# Patient Record
Sex: FEMALE | Race: WHITE | Employment: FULL TIME | URBAN - NONMETROPOLITAN AREA
[De-identification: names, ages, dates, MRNs, and addresses within clinical notes are randomized per-mention and may not be internally consistent; named-entity substitution may affect disease eponyms.]

---

## 2017-07-07 ENCOUNTER — OFFICE VISIT (OUTPATIENT)
Dept: PRIMARY CARE CLINIC | Age: 22
End: 2017-07-07
Payer: COMMERCIAL

## 2017-07-07 VITALS
HEIGHT: 61 IN | WEIGHT: 108 LBS | BODY MASS INDEX: 20.39 KG/M2 | OXYGEN SATURATION: 99 % | SYSTOLIC BLOOD PRESSURE: 100 MMHG | DIASTOLIC BLOOD PRESSURE: 60 MMHG | HEART RATE: 66 BPM

## 2017-07-07 DIAGNOSIS — S60.221A CONTUSION OF RIGHT HAND, INITIAL ENCOUNTER: Primary | ICD-10-CM

## 2017-07-07 DIAGNOSIS — M79.641 RIGHT HAND PAIN: ICD-10-CM

## 2017-07-07 PROCEDURE — 99213 OFFICE O/P EST LOW 20 MIN: CPT | Performed by: PHYSICIAN ASSISTANT

## 2017-07-07 ASSESSMENT — ENCOUNTER SYMPTOMS
COLOR CHANGE: 0
NAUSEA: 0

## 2017-07-18 ENCOUNTER — OFFICE VISIT (OUTPATIENT)
Dept: OBGYN | Age: 22
End: 2017-07-18
Payer: COMMERCIAL

## 2017-07-18 VITALS
HEIGHT: 61 IN | HEART RATE: 84 BPM | SYSTOLIC BLOOD PRESSURE: 108 MMHG | RESPIRATION RATE: 14 BRPM | DIASTOLIC BLOOD PRESSURE: 78 MMHG | WEIGHT: 107.4 LBS | BODY MASS INDEX: 20.28 KG/M2

## 2017-07-18 DIAGNOSIS — Z30.09 FAMILY PLANNING ADVICE: ICD-10-CM

## 2017-07-18 DIAGNOSIS — Z01.419 WELL FEMALE EXAM WITH ROUTINE GYNECOLOGICAL EXAM: Primary | ICD-10-CM

## 2017-07-18 DIAGNOSIS — Z11.3 SCREENING FOR STD (SEXUALLY TRANSMITTED DISEASE): ICD-10-CM

## 2017-07-18 PROCEDURE — 87070 CULTURE OTHR SPECIMN AEROBIC: CPT | Performed by: NURSE PRACTITIONER

## 2017-07-18 PROCEDURE — 86403 PARTICLE AGGLUT ANTBDY SCRN: CPT | Performed by: NURSE PRACTITIONER

## 2017-07-18 PROCEDURE — 87491 CHLMYD TRACH DNA AMP PROBE: CPT | Performed by: NURSE PRACTITIONER

## 2017-07-18 PROCEDURE — 99395 PREV VISIT EST AGE 18-39: CPT | Performed by: NURSE PRACTITIONER

## 2017-07-18 PROCEDURE — 87591 N.GONORRHOEAE DNA AMP PROB: CPT | Performed by: NURSE PRACTITIONER

## 2017-07-18 RX ORDER — LEVONORGESTREL AND ETHINYL ESTRADIOL 0.1-0.02MG
1 KIT ORAL DAILY
Qty: 84 TABLET | Refills: 0 | Status: SHIPPED | OUTPATIENT
Start: 2017-07-18 | End: 2017-09-27 | Stop reason: SDUPTHER

## 2017-07-20 LAB
C TRACH DNA GENITAL QL NAA+PROBE: NEGATIVE
N. GONORRHOEAE DNA: NEGATIVE

## 2017-07-20 RX ORDER — CEPHALEXIN 500 MG/1
500 CAPSULE ORAL 3 TIMES DAILY
Qty: 21 CAPSULE | Refills: 0 | Status: SHIPPED | OUTPATIENT
Start: 2017-07-20 | End: 2017-07-27

## 2017-07-21 ENCOUNTER — TELEPHONE (OUTPATIENT)
Dept: OBGYN | Age: 22
End: 2017-07-21

## 2017-07-21 LAB
CULTURE: ABNORMAL
Lab: ABNORMAL
SPECIMEN DESCRIPTION: ABNORMAL
SPECIMEN DESCRIPTION: ABNORMAL
STATUS: ABNORMAL

## 2017-09-27 ENCOUNTER — HOSPITAL ENCOUNTER (OUTPATIENT)
Age: 22
Setting detail: SPECIMEN
Discharge: HOME | End: 2017-09-27
Payer: COMMERCIAL

## 2017-09-27 ENCOUNTER — OFFICE VISIT (OUTPATIENT)
Dept: OBGYN | Age: 22
End: 2017-09-27
Payer: COMMERCIAL

## 2017-09-27 VITALS
DIASTOLIC BLOOD PRESSURE: 58 MMHG | SYSTOLIC BLOOD PRESSURE: 90 MMHG | WEIGHT: 110.8 LBS | HEIGHT: 61 IN | BODY MASS INDEX: 20.92 KG/M2 | HEART RATE: 72 BPM

## 2017-09-27 DIAGNOSIS — B37.31 MONILIAL VAGINITIS: Primary | ICD-10-CM

## 2017-09-27 DIAGNOSIS — Z79.899 MEDICATION MANAGEMENT: ICD-10-CM

## 2017-09-27 DIAGNOSIS — N94.9 VAGINAL BURNING: ICD-10-CM

## 2017-09-27 LAB
CLUE CELLS: NEGATIVE
HYPHAL YEAST: ABNORMAL
KOH RESULT: NEGATIVE
TRICHOMONAS: NEGATIVE
WET PREP: NEGATIVE
WHITE BLOOD CELLS: NEGATIVE

## 2017-09-27 PROCEDURE — 87660 TRICHOMONAS VAGIN DIR PROBE: CPT

## 2017-09-27 PROCEDURE — 87480 CANDIDA DNA DIR PROBE: CPT

## 2017-09-27 PROCEDURE — 87210 SMEAR WET MOUNT SALINE/INK: CPT | Performed by: NURSE PRACTITIONER

## 2017-09-27 PROCEDURE — 87070 CULTURE OTHR SPECIMN AEROBIC: CPT

## 2017-09-27 PROCEDURE — 99214 OFFICE O/P EST MOD 30 MIN: CPT | Performed by: NURSE PRACTITIONER

## 2017-09-27 PROCEDURE — 87510 GARDNER VAG DNA DIR PROBE: CPT

## 2017-09-27 RX ORDER — LEVONORGESTREL AND ETHINYL ESTRADIOL 0.1-0.02MG
1 KIT ORAL DAILY
Qty: 28 TABLET | Refills: 13 | Status: SHIPPED | OUTPATIENT
Start: 2017-09-27 | End: 2018-07-17 | Stop reason: SDUPTHER

## 2017-09-28 LAB
DIRECT EXAM: ABNORMAL
Lab: ABNORMAL
SPECIMEN DESCRIPTION: ABNORMAL
SPECIMEN DESCRIPTION: ABNORMAL
STATUS: ABNORMAL

## 2017-10-16 ENCOUNTER — NURSE ONLY (OUTPATIENT)
Dept: LAB | Age: 22
End: 2017-10-16
Payer: COMMERCIAL

## 2017-10-16 DIAGNOSIS — Z23 NEED FOR IMMUNIZATION AGAINST INFLUENZA: Primary | ICD-10-CM

## 2017-10-16 DIAGNOSIS — Z11.1 PPD SCREENING TEST: ICD-10-CM

## 2017-10-16 PROCEDURE — 86580 TB INTRADERMAL TEST: CPT | Performed by: FAMILY MEDICINE

## 2017-10-16 PROCEDURE — 90686 IIV4 VACC NO PRSV 0.5 ML IM: CPT | Performed by: FAMILY MEDICINE

## 2017-10-16 PROCEDURE — 90471 IMMUNIZATION ADMIN: CPT | Performed by: FAMILY MEDICINE

## 2017-10-16 NOTE — PROGRESS NOTES
Have you had an allergic reaction to the flu (influenza) shot? no  Are you allergic to eggs or any component of the flu vaccine? no  Do you have a history of Guillain-Erie Syndrome (GBS), which is paralysis after receiving the flu vaccine? no  Are you feeling well today? yes  Flu vaccine given as ordered. Patient tolerated it well. No questions re: VIS information.

## 2017-10-18 ENCOUNTER — NURSE ONLY (OUTPATIENT)
Dept: LAB | Age: 22
End: 2017-10-18

## 2017-10-18 DIAGNOSIS — Z11.1 ENCOUNTER FOR PPD SKIN TEST READING: Primary | ICD-10-CM

## 2017-10-18 LAB
INDURATION: 0
TB SKIN TEST: NORMAL

## 2018-04-10 ENCOUNTER — TELEPHONE (OUTPATIENT)
Dept: OBGYN | Age: 23
End: 2018-04-10

## 2018-04-10 DIAGNOSIS — N91.2 AMENORRHEA: Primary | ICD-10-CM

## 2018-04-12 ENCOUNTER — HOSPITAL ENCOUNTER (OUTPATIENT)
Dept: LAB | Age: 23
Setting detail: SPECIMEN
Discharge: HOME | End: 2018-04-12
Payer: COMMERCIAL

## 2018-04-12 DIAGNOSIS — N91.2 AMENORRHEA: ICD-10-CM

## 2018-04-12 LAB — HCG QUALITATIVE: NEGATIVE

## 2018-04-12 PROCEDURE — 36415 COLL VENOUS BLD VENIPUNCTURE: CPT

## 2018-04-12 PROCEDURE — 84703 CHORIONIC GONADOTROPIN ASSAY: CPT

## 2018-05-21 ENCOUNTER — OFFICE VISIT (OUTPATIENT)
Dept: PRIMARY CARE CLINIC | Age: 23
End: 2018-05-21
Payer: COMMERCIAL

## 2018-05-21 ENCOUNTER — HOSPITAL ENCOUNTER (OUTPATIENT)
Age: 23
Setting detail: SPECIMEN
Discharge: HOME | End: 2018-05-21
Payer: COMMERCIAL

## 2018-05-21 VITALS
SYSTOLIC BLOOD PRESSURE: 92 MMHG | HEIGHT: 61 IN | HEART RATE: 72 BPM | BODY MASS INDEX: 20.77 KG/M2 | RESPIRATION RATE: 16 BRPM | OXYGEN SATURATION: 99 % | WEIGHT: 110 LBS | DIASTOLIC BLOOD PRESSURE: 68 MMHG | TEMPERATURE: 97.8 F

## 2018-05-21 DIAGNOSIS — N76.2 ACUTE VULVITIS: ICD-10-CM

## 2018-05-21 DIAGNOSIS — N76.2 ACUTE VULVITIS: Primary | ICD-10-CM

## 2018-05-21 DIAGNOSIS — Z13.31 POSITIVE DEPRESSION SCREENING: ICD-10-CM

## 2018-05-21 PROCEDURE — 87491 CHLMYD TRACH DNA AMP PROBE: CPT

## 2018-05-21 PROCEDURE — 87660 TRICHOMONAS VAGIN DIR PROBE: CPT

## 2018-05-21 PROCEDURE — G0444 DEPRESSION SCREEN ANNUAL: HCPCS | Performed by: FAMILY MEDICINE

## 2018-05-21 PROCEDURE — G8431 POS CLIN DEPRES SCRN F/U DOC: HCPCS | Performed by: FAMILY MEDICINE

## 2018-05-21 PROCEDURE — 99213 OFFICE O/P EST LOW 20 MIN: CPT | Performed by: FAMILY MEDICINE

## 2018-05-21 PROCEDURE — 87480 CANDIDA DNA DIR PROBE: CPT

## 2018-05-21 PROCEDURE — 87591 N.GONORRHOEAE DNA AMP PROB: CPT

## 2018-05-21 PROCEDURE — 87510 GARDNER VAG DNA DIR PROBE: CPT

## 2018-05-21 ASSESSMENT — PATIENT HEALTH QUESTIONNAIRE - PHQ9
7. TROUBLE CONCENTRATING ON THINGS, SUCH AS READING THE NEWSPAPER OR WATCHING TELEVISION: 1
SUM OF ALL RESPONSES TO PHQ9 QUESTIONS 1 & 2: 4
5. POOR APPETITE OR OVEREATING: 1
3. TROUBLE FALLING OR STAYING ASLEEP: 2
8. MOVING OR SPEAKING SO SLOWLY THAT OTHER PEOPLE COULD HAVE NOTICED. OR THE OPPOSITE, BEING SO FIGETY OR RESTLESS THAT YOU HAVE BEEN MOVING AROUND A LOT MORE THAN USUAL: 1
10. IF YOU CHECKED OFF ANY PROBLEMS, HOW DIFFICULT HAVE THESE PROBLEMS MADE IT FOR YOU TO DO YOUR WORK, TAKE CARE OF THINGS AT HOME, OR GET ALONG WITH OTHER PEOPLE: 1
1. LITTLE INTEREST OR PLEASURE IN DOING THINGS: 2
SUM OF ALL RESPONSES TO PHQ QUESTIONS 1-9: 12
2. FEELING DOWN, DEPRESSED OR HOPELESS: 2
6. FEELING BAD ABOUT YOURSELF - OR THAT YOU ARE A FAILURE OR HAVE LET YOURSELF OR YOUR FAMILY DOWN: 1
4. FEELING TIRED OR HAVING LITTLE ENERGY: 2
9. THOUGHTS THAT YOU WOULD BE BETTER OFF DEAD, OR OF HURTING YOURSELF: 0

## 2018-05-22 LAB
C TRACH DNA GENITAL QL NAA+PROBE: NEGATIVE
N. GONORRHOEAE DNA: NEGATIVE

## 2018-05-22 RX ORDER — METRONIDAZOLE 500 MG/1
500 TABLET ORAL 2 TIMES DAILY
Qty: 14 TABLET | Refills: 0 | Status: SHIPPED | OUTPATIENT
Start: 2018-05-22 | End: 2018-05-29

## 2018-05-23 ENCOUNTER — HOSPITAL ENCOUNTER (OUTPATIENT)
Age: 23
Setting detail: SPECIMEN
Discharge: HOME | End: 2018-05-23
Payer: COMMERCIAL

## 2018-05-23 DIAGNOSIS — N76.2 ACUTE VULVITIS: Primary | ICD-10-CM

## 2018-05-23 DIAGNOSIS — N76.2 ACUTE VULVITIS: ICD-10-CM

## 2018-05-23 PROCEDURE — 87140 CULTURE TYPE IMMUNOFLUORESC: CPT

## 2018-05-23 PROCEDURE — 87255 GENET VIRUS ISOLATE HSV: CPT

## 2018-05-23 PROCEDURE — 87252 VIRUS INOCULATION TISSUE: CPT

## 2018-05-23 PROCEDURE — 87015 SPECIMEN INFECT AGNT CONCNTJ: CPT

## 2018-05-27 ASSESSMENT — ENCOUNTER SYMPTOMS
RESPIRATORY NEGATIVE: 1
EYES NEGATIVE: 1
GASTROINTESTINAL NEGATIVE: 1

## 2018-05-29 LAB
CULTURE: NORMAL
Lab: NORMAL
SPECIMEN DESCRIPTION: NORMAL
SPECIMEN DESCRIPTION: NORMAL
STATUS: NORMAL

## 2018-05-31 ENCOUNTER — TELEPHONE (OUTPATIENT)
Dept: FAMILY MEDICINE CLINIC | Age: 23
End: 2018-05-31

## 2018-07-17 ENCOUNTER — OFFICE VISIT (OUTPATIENT)
Dept: OBGYN | Age: 23
End: 2018-07-17
Payer: COMMERCIAL

## 2018-07-17 ENCOUNTER — HOSPITAL ENCOUNTER (OUTPATIENT)
Age: 23
Setting detail: SPECIMEN
Discharge: HOME | End: 2018-07-17
Payer: COMMERCIAL

## 2018-07-17 VITALS
SYSTOLIC BLOOD PRESSURE: 90 MMHG | BODY MASS INDEX: 21.49 KG/M2 | HEIGHT: 61 IN | HEART RATE: 68 BPM | WEIGHT: 113.8 LBS | DIASTOLIC BLOOD PRESSURE: 62 MMHG

## 2018-07-17 DIAGNOSIS — N89.8 VAGINAL IRRITATION: ICD-10-CM

## 2018-07-17 DIAGNOSIS — Z12.4 CERVICAL CANCER SCREENING: ICD-10-CM

## 2018-07-17 DIAGNOSIS — B37.31 MONILIAL VAGINITIS: ICD-10-CM

## 2018-07-17 DIAGNOSIS — Z01.419 WELL FEMALE EXAM WITH ROUTINE GYNECOLOGICAL EXAM: Primary | ICD-10-CM

## 2018-07-17 LAB
CLUE CELLS: ABNORMAL
HYPHAL YEAST: PRESENT
KOH RESULT: NEGATIVE
TRICHOMONAS: NEGATIVE
WET PREP: ABNORMAL
WHITE BLOOD CELLS: NEGATIVE

## 2018-07-17 PROCEDURE — 87660 TRICHOMONAS VAGIN DIR PROBE: CPT

## 2018-07-17 PROCEDURE — 87210 SMEAR WET MOUNT SALINE/INK: CPT | Performed by: NURSE PRACTITIONER

## 2018-07-17 PROCEDURE — 87480 CANDIDA DNA DIR PROBE: CPT

## 2018-07-17 PROCEDURE — G0145 SCR C/V CYTO,THINLAYER,RESCR: HCPCS

## 2018-07-17 PROCEDURE — 87070 CULTURE OTHR SPECIMN AEROBIC: CPT

## 2018-07-17 PROCEDURE — 99395 PREV VISIT EST AGE 18-39: CPT | Performed by: NURSE PRACTITIONER

## 2018-07-17 PROCEDURE — 87510 GARDNER VAG DNA DIR PROBE: CPT

## 2018-07-17 RX ORDER — LEVONORGESTREL AND ETHINYL ESTRADIOL 0.1-0.02MG
1 KIT ORAL DAILY
Qty: 84 TABLET | Refills: 5 | Status: SHIPPED | OUTPATIENT
Start: 2018-07-17 | End: 2019-08-28 | Stop reason: SDUPTHER

## 2018-07-17 NOTE — PROGRESS NOTES
of education: N/A     Occupational History    Not on file. Social History Main Topics    Smoking status: Never Smoker    Smokeless tobacco: Never Used    Alcohol use No    Drug use: No    Sexual activity: No     Other Topics Concern    Not on file     Social History Narrative    ** Merged History Encounter **            MEDICATIONS:  Current Outpatient Prescriptions   Medication Sig Dispense Refill    levonorgestrel-ethinyl estradiol (AVIANE;ALESSE;LESSINA) 0.1-20 MG-MCG per tablet Take 1 tablet by mouth daily 84 tablet 5    terconazole (TERAZOL 3) 0.8 % vaginal cream Place 1 applicator vaginally nightly for 3 days Place vaginally nightly. 1 Tube 0    nystatin-triamcinolone (MYCOLOG II) 620297-5.1 UNIT/GM-% cream Apply topically 2 times daily. 1 Tube 1     No current facility-administered medications for this visit. ALLERGIES:  Allergies as of 07/17/2018    (No Known Allergies)           Gynecologic History:  Menarche: 17 yo  Patient's last menstrual period was 07/12/2018. Hormone Exposure: Yes OCP    Family History of Breast, Ovarian , Colon or Uterine Cancer: Yes 2 maternal aunts with breast cancer       ________________________________________________________________________  REVIEW OF SYSTEMS:     A minimum of an eleven point review of systems was completed.     Review Of Systems (11 point):  General ROS:  negative  Hematological and Lymphatic ROS:negative   Breast ROS: negative  Cardiovascular ROS: negative  Respiratory ROS: negative   Gastrointestinal ROS: negative  Genito-Urinary ROS: positive for vaginal irritation  Psychological ROS: negative  Neurological ROS: negative  Musculoskeletal ROS: negative  Dermatological ROS: negative                                                                                                                                                                                   PHYSICAL Exam:     Constitutional:  Blood pressure 90/62, pulse 68, height 5' 1\" (1.549 m), weight 113 lb 12.8 oz (51.6 kg), last menstrual period 07/12/2018, not currently breastfeeding. General Appearance: This  is a well Developed, well Nourished, well groomed female. Her BMI was reviewed. Skin:  There was a Normal Inspection of the skin without rashes or lesions. There were no rashes. (Papular, Maculopapular, Hives, Pustular, Macular)     There were no lesions (Ulcers, Erythema, Abn. Appearing Nevi)      Lymphatic:  No Lymph Nodes were Palpable in the neck , axilla or groin. Neck and EENT:  The neck was supple. There were no masses   The thyroid was not enlarged and had no masses. PERRLA, Nares Patent No Masses    Respiratory: The lungs were auscultated and found to be clear. There were no rales, rhonchi or wheezes. There was a good respiratory effort. Cardiovascular: The heart was in a regular rate and rhythm. . No S3 or S4. There was no murmur appreciated. Extremities: The patients extremities were without calf tenderness, edema, or varicosities. There was full range of motion in all four extremities. Pulses in all four extremities    Abdomen: The abdomen was soft and non-tender. There were good bowel sounds in all quadrants and there was no guarding, rebound or rigidity. On evaluation there was no evidence of hepatosplenomegaly and there was no costal vertebral siria tenderness bilaterally. No hernias were appreciated. Psych:   The patient had a normal Orientation to: Time, Place, Person, and Situation  Mood and affect appropriate    Breast:  (Chest)  normal appearance, no masses or tenderness, Inspection negative, No nipple retraction or dimpling, No nipple discharge or bleeding, No axillary or supraclavicular adenopathy, Normal to palpation without dominant masses, Taught monthly breast self examination, negative findings: normal in size and symmetry, normal contour with no evidence of flattening or dimpling, skin normal, nipples everted without rashes last menstrual period was 07/12/2018. OBGYN Status: Having periods  Past Surgical History:  No date: WISDOM TOOTH EXTRACTION Bilateral  Medications/Contraceptives Affecting Cytology     Combination Contraceptives - Oral Disp Start End    levonorgestrel-ethinyl estradiol (AVIANE;ALESSE;LESSINA) 0.1-20 MG-MCG   per tablet 28 tablet 9/27/2017     Sig: Take 1 tablet by mouth daily    Notes to Pharmacy: Please cancel any old prescriptions for this product    Route: Oral        Smoking status: Never Smoker                                                              Smokeless tobacco: Never Used                           Standing Status:   Future     Standing Expiration Date:   7/17/2019     Order Specific Question:   Collection Type     Answer: Thin Prep     Order Specific Question:   Prior Abnormal Pap Test     Answer:   No     Order Specific Question:   Screening or Diagnostic     Answer:   Screening     Order Specific Question:   HPV Requested?      Answer:   Yes - If Abnormal Reflex HPV     Order Specific Question:   High Risk Patient     Answer:   N/A    POCT Wet Prep       Claude Bower [place]  7/17/2018      (Please note that portions of this note were completed with a voice-recognition program. Efforts were made to edit the dictations but occasionally words are mis-transcribed.)

## 2018-07-18 LAB
DIRECT EXAM: ABNORMAL
Lab: ABNORMAL
SPECIMEN DESCRIPTION: ABNORMAL
STATUS: ABNORMAL

## 2018-07-21 LAB
CULTURE: ABNORMAL
Lab: ABNORMAL
SPECIMEN DESCRIPTION: ABNORMAL
STATUS: ABNORMAL

## 2018-08-02 LAB — CYTOLOGY REPORT: NORMAL

## 2019-01-07 ENCOUNTER — OFFICE VISIT (OUTPATIENT)
Dept: PRIMARY CARE CLINIC | Age: 24
End: 2019-01-07

## 2019-01-07 VITALS
HEART RATE: 88 BPM | TEMPERATURE: 98.2 F | SYSTOLIC BLOOD PRESSURE: 110 MMHG | DIASTOLIC BLOOD PRESSURE: 74 MMHG | HEIGHT: 61 IN | BODY MASS INDEX: 22.69 KG/M2 | OXYGEN SATURATION: 99 % | WEIGHT: 120.2 LBS

## 2019-01-07 DIAGNOSIS — J06.9 VIRAL UPPER RESPIRATORY TRACT INFECTION: Primary | ICD-10-CM

## 2019-01-07 DIAGNOSIS — J02.9 SORE THROAT: ICD-10-CM

## 2019-01-07 LAB — S PYO AG THROAT QL: NORMAL

## 2019-01-07 PROCEDURE — 87880 STREP A ASSAY W/OPTIC: CPT | Performed by: NURSE PRACTITIONER

## 2019-01-07 PROCEDURE — 99213 OFFICE O/P EST LOW 20 MIN: CPT | Performed by: NURSE PRACTITIONER

## 2019-01-07 ASSESSMENT — ENCOUNTER SYMPTOMS
SHORTNESS OF BREATH: 0
WHEEZING: 0
SINUS PRESSURE: 1
SINUS COMPLAINT: 1
RHINORRHEA: 1
SORE THROAT: 1
GASTROINTESTINAL NEGATIVE: 1
COUGH: 1

## 2019-01-07 ASSESSMENT — PATIENT HEALTH QUESTIONNAIRE - PHQ9
SUM OF ALL RESPONSES TO PHQ QUESTIONS 1-9: 0
SUM OF ALL RESPONSES TO PHQ QUESTIONS 1-9: 0
2. FEELING DOWN, DEPRESSED OR HOPELESS: 0
SUM OF ALL RESPONSES TO PHQ9 QUESTIONS 1 & 2: 0
1. LITTLE INTEREST OR PLEASURE IN DOING THINGS: 0

## 2019-08-28 ENCOUNTER — OFFICE VISIT (OUTPATIENT)
Dept: OBGYN | Age: 24
End: 2019-08-28
Payer: COMMERCIAL

## 2019-08-28 ENCOUNTER — HOSPITAL ENCOUNTER (OUTPATIENT)
Dept: LAB | Age: 24
Discharge: HOME | End: 2019-08-28
Payer: COMMERCIAL

## 2019-08-28 VITALS
WEIGHT: 116 LBS | DIASTOLIC BLOOD PRESSURE: 60 MMHG | SYSTOLIC BLOOD PRESSURE: 96 MMHG | HEART RATE: 74 BPM | BODY MASS INDEX: 21.9 KG/M2 | HEIGHT: 61 IN

## 2019-08-28 DIAGNOSIS — N89.8 VAGINAL DISCHARGE: ICD-10-CM

## 2019-08-28 DIAGNOSIS — Z01.419 WELL FEMALE EXAM WITH ROUTINE GYNECOLOGICAL EXAM: ICD-10-CM

## 2019-08-28 DIAGNOSIS — Z13.1 SCREENING FOR DIABETES MELLITUS: ICD-10-CM

## 2019-08-28 DIAGNOSIS — Z11.3 SCREENING FOR STD (SEXUALLY TRANSMITTED DISEASE): ICD-10-CM

## 2019-08-28 DIAGNOSIS — Z12.4 CERVICAL CANCER SCREENING: ICD-10-CM

## 2019-08-28 DIAGNOSIS — Z01.419 WELL FEMALE EXAM WITH ROUTINE GYNECOLOGICAL EXAM: Primary | ICD-10-CM

## 2019-08-28 LAB
ABSOLUTE EOS #: 0.1 K/UL (ref 0–0.4)
ABSOLUTE IMMATURE GRANULOCYTE: NORMAL K/UL (ref 0–0.3)
ABSOLUTE LYMPH #: 1.6 K/UL (ref 1–4.8)
ABSOLUTE MONO #: 0.5 K/UL (ref 0.1–1.2)
ANION GAP SERPL CALCULATED.3IONS-SCNC: 13 MMOL/L (ref 9–17)
BASOPHILS # BLD: 1 % (ref 0–1)
BASOPHILS ABSOLUTE: 0 K/UL (ref 0–0.2)
BUN BLDV-MCNC: 12 MG/DL (ref 6–20)
BUN/CREAT BLD: 22 (ref 9–20)
CALCIUM SERPL-MCNC: 9.5 MG/DL (ref 8.6–10.4)
CHLORIDE BLD-SCNC: 102 MMOL/L (ref 98–107)
CLUE CELLS: NEGATIVE
CO2: 26 MMOL/L (ref 20–31)
CREAT SERPL-MCNC: 0.55 MG/DL (ref 0.5–0.9)
DIFFERENTIAL TYPE: NORMAL
DIRECT EXAM: ABNORMAL
EOSINOPHILS RELATIVE PERCENT: 1 % (ref 1–7)
GFR AFRICAN AMERICAN: >60 ML/MIN
GFR NON-AFRICAN AMERICAN: >60 ML/MIN
GFR SERPL CREATININE-BSD FRML MDRD: ABNORMAL ML/MIN/{1.73_M2}
GFR SERPL CREATININE-BSD FRML MDRD: ABNORMAL ML/MIN/{1.73_M2}
GLUCOSE BLD-MCNC: 86 MG/DL (ref 70–99)
HCT VFR BLD CALC: 44.9 % (ref 36–46)
HEMOGLOBIN: 15.4 G/DL (ref 12–16)
HYPHAL YEAST: NEGATIVE
IMMATURE GRANULOCYTES: NORMAL %
KOH RESULT: NEGATIVE
LYMPHOCYTES # BLD: 21 % (ref 16–46)
Lab: ABNORMAL
MCH RBC QN AUTO: 31.6 PG (ref 26–34)
MCHC RBC AUTO-ENTMCNC: 34.3 G/DL (ref 31–37)
MCV RBC AUTO: 92.2 FL (ref 80–100)
MONOCYTES # BLD: 7 % (ref 4–11)
NRBC AUTOMATED: NORMAL PER 100 WBC
PDW BLD-RTO: 12.4 % (ref 11–14.5)
PLATELET # BLD: 299 K/UL (ref 140–450)
PLATELET ESTIMATE: NORMAL
PMV BLD AUTO: 8.2 FL (ref 6–12)
POTASSIUM SERPL-SCNC: 4 MMOL/L (ref 3.7–5.3)
RBC # BLD: 4.87 M/UL (ref 4–5.2)
RBC # BLD: NORMAL 10*6/UL
SEG NEUTROPHILS: 70 % (ref 43–77)
SEGMENTED NEUTROPHILS ABSOLUTE COUNT: 5.2 K/UL (ref 1.8–7.7)
SODIUM BLD-SCNC: 141 MMOL/L (ref 135–144)
SPECIMEN DESCRIPTION: ABNORMAL
TRICHOMONAS: NEGATIVE
WBC # BLD: 7.4 K/UL (ref 3.5–11)
WBC # BLD: NORMAL 10*3/UL
WET PREP: NORMAL
WHITE BLOOD CELLS: NEGATIVE

## 2019-08-28 PROCEDURE — 80048 BASIC METABOLIC PNL TOTAL CA: CPT

## 2019-08-28 PROCEDURE — 87480 CANDIDA DNA DIR PROBE: CPT

## 2019-08-28 PROCEDURE — 85025 COMPLETE CBC W/AUTO DIFF WBC: CPT

## 2019-08-28 PROCEDURE — 36415 COLL VENOUS BLD VENIPUNCTURE: CPT

## 2019-08-28 PROCEDURE — 99395 PREV VISIT EST AGE 18-39: CPT | Performed by: NURSE PRACTITIONER

## 2019-08-28 PROCEDURE — 87660 TRICHOMONAS VAGIN DIR PROBE: CPT

## 2019-08-28 PROCEDURE — 87510 GARDNER VAG DNA DIR PROBE: CPT

## 2019-08-28 PROCEDURE — 87591 N.GONORRHOEAE DNA AMP PROB: CPT

## 2019-08-28 PROCEDURE — 87210 SMEAR WET MOUNT SALINE/INK: CPT | Performed by: NURSE PRACTITIONER

## 2019-08-28 PROCEDURE — 87070 CULTURE OTHR SPECIMN AEROBIC: CPT

## 2019-08-28 PROCEDURE — 87491 CHLMYD TRACH DNA AMP PROBE: CPT

## 2019-08-28 RX ORDER — LEVONORGESTREL AND ETHINYL ESTRADIOL 0.1-0.02MG
1 KIT ORAL DAILY
Qty: 84 TABLET | Refills: 5 | Status: SHIPPED | OUTPATIENT
Start: 2019-08-28 | End: 2020-11-05

## 2019-08-28 NOTE — PROGRESS NOTES
four extremities    Abdomen: The abdomen was soft and non-tender. There were good bowel sounds in all quadrants and there was no guarding, rebound or rigidity. On evaluation there was no evidence of hepatosplenomegaly and there was no costal vertebral siria tenderness bilaterally. No hernias were appreciated. Psych: The patient had a normal Orientation to: Time, Place, Person, and Situation  Mood and affect appropriate    Breast:  (Chest)  normal appearance, no masses or tenderness, Inspection negative, No nipple retraction or dimpling, No nipple discharge or bleeding, No axillary or supraclavicular adenopathy, Normal to palpation without dominant masses, Taught monthly breast self examination      Pelvic Exam:  External genitalia: normal general appearance  Urinary system: urethral meatus normal  Vaginal: normal mucosa without prolapse or lesions, normal without tenderness, induration or masses, normal rugae and copious creamy discharge  Cervix: normal appearance and  Adnexa: normal bimanual exam and non palpable  Uterus: normal single, nontender  Saline Prep negative for pathology  Miscellaneous vaginal culture, and swabs for gonorrhea/chlamydia as well as vaginitis DNA obtained     Musculosk:  Normal Gait and station was noted. Digits were evaluated without abnormal findings. Range of motion, stability and strength were evaluated and found to be appropriate for the patients age. ASSESSMENT:      27 y.o. Annual   Diagnosis Orders   1. Well female exam with routine gynecological exam  CBC Auto Differential   2. Cervical cancer screening     3. Screening for diabetes mellitus  Basic Metabolic Panel   4. Screening for STD (sexually transmitted disease)  VAGINITIS DNA PROBE    C.trachomatis N.gonorrhoeae DNA   5. Vaginal discharge  POCT Wet Prep    VAGINITIS DNA PROBE    Culture, Genital        Chief Complaint   Patient presents with    Gynecologic Exam     pap         History reviewed.  No pertinent past

## 2019-08-28 NOTE — PATIENT INSTRUCTIONS
feel your breast tissue before moving on to the next spot. ? Check your entire breast, moving up and down as if following a strip from the collarbone to the bra line, and from the armpit to the ribs. Repeat until you have covered the entire breast.  ? Repeat this procedure for your left breast, using the pads of the 3 middle fingers of your right hand. · To examine your breasts while in the shower:  ? Place one arm over your head and lightly soap your breast on that side. ? Using the pads of your fingers, gently move your hand over your breast (in the strip pattern described above), feeling carefully for any lumps or changes. ? Repeat for the other breast.  · Have your doctor inspect anything you notice to see if you need further testing. Where can you learn more? Go to [email or web address] and sign in to your [employer] account. Enter P148 in the [employer] box to learn more about \"Breast Self-Exam: Care Instructions. \"     If you do not have an account, please click on the \"Sign Up Now\" link. Current as of: December 19, 2018  Content Version: 12.1  © 8688-5751 Healthwise, Incorporated. Care instructions adapted under license by [facility]. If you have questions about a medical condition or this instruction, always ask your healthcare professional. [address] any warranty or liability for your use of this information.

## 2019-08-30 LAB
C TRACH DNA GENITAL QL NAA+PROBE: NEGATIVE
N. GONORRHOEAE DNA: NEGATIVE
SPECIMEN DESCRIPTION: NORMAL

## 2019-08-30 RX ORDER — METRONIDAZOLE 500 MG/1
500 TABLET ORAL 2 TIMES DAILY WITH MEALS
Qty: 14 TABLET | Refills: 0 | Status: SHIPPED | OUTPATIENT
Start: 2019-08-30 | End: 2019-09-06

## 2019-08-31 LAB
CULTURE: NORMAL
Lab: NORMAL
SPECIMEN DESCRIPTION: NORMAL

## 2019-09-11 ENCOUNTER — OFFICE VISIT (OUTPATIENT)
Dept: OBGYN | Age: 24
End: 2019-09-11
Payer: COMMERCIAL

## 2019-09-11 ENCOUNTER — HOSPITAL ENCOUNTER (OUTPATIENT)
Age: 24
Setting detail: SPECIMEN
Discharge: HOME | End: 2019-09-11
Payer: COMMERCIAL

## 2019-09-11 VITALS
BODY MASS INDEX: 22.84 KG/M2 | DIASTOLIC BLOOD PRESSURE: 60 MMHG | SYSTOLIC BLOOD PRESSURE: 100 MMHG | HEIGHT: 61 IN | HEART RATE: 92 BPM | WEIGHT: 121 LBS

## 2019-09-11 DIAGNOSIS — N89.8 VAGINAL DISCHARGE: Primary | ICD-10-CM

## 2019-09-11 DIAGNOSIS — Z12.4 CERVICAL CANCER SCREENING: ICD-10-CM

## 2019-09-11 DIAGNOSIS — N89.8 VAGINAL DISCHARGE: ICD-10-CM

## 2019-09-11 LAB
DIRECT EXAM: NORMAL
Lab: NORMAL
SPECIMEN DESCRIPTION: NORMAL

## 2019-09-11 PROCEDURE — 87624 HPV HI-RISK TYP POOLED RSLT: CPT

## 2019-09-11 PROCEDURE — 99213 OFFICE O/P EST LOW 20 MIN: CPT | Performed by: NURSE PRACTITIONER

## 2019-09-11 PROCEDURE — 87660 TRICHOMONAS VAGIN DIR PROBE: CPT

## 2019-09-11 PROCEDURE — 87510 GARDNER VAG DNA DIR PROBE: CPT

## 2019-09-11 PROCEDURE — 87070 CULTURE OTHR SPECIMN AEROBIC: CPT

## 2019-09-11 PROCEDURE — 87480 CANDIDA DNA DIR PROBE: CPT

## 2019-09-11 PROCEDURE — G0145 SCR C/V CYTO,THINLAYER,RESCR: HCPCS

## 2019-09-11 NOTE — PROGRESS NOTES
Subjective:  Hue Gutierrez presents for follow up of treatment for BV. Patient has long history of candidiasis which is usually asymptomatic. Cultures negative for yeast at last visit. Completed course of flagyl for BV. No change in amount of discharge. Wears liner daily. Due for pap. Will be starting OCP with next menses. Patient Active Problem List   Diagnosis    Sprain of ankle     Problem list reviewed as part of this encounter. Medication list reviewed and updated. See nursing note. History of present illness reviewed in detail and expanded by me. Review Of Systems:  General ROS:  negative  Hematological and Lymphatic ROS:negative   Breast ROS: negative  Cardiovascular ROS: negative  Respiratory ROS: negative   Gastrointestinal ROS: negative  Genito-Urinary ROS: positive for vaginal dishcarge  Psychological ROS: negative  Neurological ROS: negative  Musculoskeletal ROS: negative  Dermatological ROS: negative                                                                                                                                          Objective:  Vitals:    09/11/19 1306   BP: 100/60   Pulse: 92     Alert and oriented. Abdomen: Soft, non-tender, no masses. No CVA tenderness  External Genetalia: Normal appearance of vulva,  Bartholin Glands, urethra, and [place]'s ducts  Vagina: Normal appearance of mucosa and rugae. No unusual discharge  Cervix: Normal appearance. No lesions noted. Thin prep pap obtained  Uterus: Normal size, mobile, no CMT, non-tender  Adnexae: Non-palpable, non-tender, no masses    Miscellaneous vaginal culture as well as vaginitis DNA obtained     Assessment:  Encounter Diagnoses   Name Primary?  Vaginal discharge Yes    Cervical cancer screening        Plan:  See orders.   Orders Placed This Encounter   Procedures    VAGINITIS DNA PROBE     Standing Status:   Future     Standing Expiration Date:   10/11/2019    Culture, Genital     Standing Status:

## 2019-09-14 LAB
CULTURE: NORMAL
Lab: NORMAL
SPECIMEN DESCRIPTION: NORMAL

## 2019-09-20 LAB — CYTOLOGY REPORT: NORMAL

## 2019-09-24 LAB
HPV SAMPLE: ABNORMAL
HPV, GENOTYPE 16: NOT DETECTED
HPV, GENOTYPE 18: NOT DETECTED
HPV, HIGH RISK OTHER: DETECTED
HPV, INTERPRETATION: ABNORMAL
SPECIMEN DESCRIPTION: ABNORMAL

## 2019-09-30 ENCOUNTER — HOSPITAL ENCOUNTER (OUTPATIENT)
Dept: LAB | Age: 24
Discharge: HOME | End: 2019-09-30
Payer: COMMERCIAL

## 2019-09-30 ENCOUNTER — OFFICE VISIT (OUTPATIENT)
Dept: OBGYN | Age: 24
End: 2019-09-30
Payer: COMMERCIAL

## 2019-09-30 VITALS
BODY MASS INDEX: 23.03 KG/M2 | SYSTOLIC BLOOD PRESSURE: 100 MMHG | DIASTOLIC BLOOD PRESSURE: 60 MMHG | HEIGHT: 61 IN | HEART RATE: 70 BPM | WEIGHT: 122 LBS

## 2019-09-30 DIAGNOSIS — Z13.21 ENCOUNTER FOR VITAMIN DEFICIENCY SCREENING: ICD-10-CM

## 2019-09-30 DIAGNOSIS — R87.810 ASCUS WITH POSITIVE HIGH RISK HPV CERVICAL: Primary | ICD-10-CM

## 2019-09-30 DIAGNOSIS — R87.610 ASCUS WITH POSITIVE HIGH RISK HPV CERVICAL: Primary | ICD-10-CM

## 2019-09-30 LAB — VITAMIN D 25-HYDROXY: 20.6 NG/ML (ref 30–100)

## 2019-09-30 PROCEDURE — 99213 OFFICE O/P EST LOW 20 MIN: CPT | Performed by: NURSE PRACTITIONER

## 2019-09-30 PROCEDURE — 36415 COLL VENOUS BLD VENIPUNCTURE: CPT

## 2019-09-30 PROCEDURE — 82306 VITAMIN D 25 HYDROXY: CPT

## 2019-09-30 NOTE — PROGRESS NOTES
· You need to reschedule or have changed your mind about having the procedure. Where can you learn more? Go to [email or web address] and sign in to your [employer] account. Enter M886 in the [facility] box to learn more about \"Colposcopy: Before Your Procedure. \"     If you do not have an account, please click on the \"Sign Up Now\" link. Current as of: December 19, 2018  Content Version: 12.1  © 9504-1311 [employer]. Care instructions adapted under license by [facility] customer insight [facility] ([facility]). If you have questions about a medical condition or this instruction, always ask your healthcare professional. [address] any warranty or liability for your use of this information. Patient Education        Colposcopy: What to Expect at [address] may feel some soreness in your vagina for a day or two if you had a biopsy. Some vaginal bleeding or discharge is normal for up to a week after a biopsy. The discharge may be dark-colored if a solution was put on your cervix. You can use a sanitary pad for the bleeding. It may take a week or two for you to get the test results. This care sheet gives you a general idea about how long it will take for you to recover. But each person recovers at a different pace. Follow the steps below to feel better as quickly as possible. How can you care for yourself at home? Activity    · You can return to work and most daily activities right after the test.   Exercise    · Do not exercise for 1 day after the test.   Medicines    · Your doctor will tell you if and when you can restart your medicines. He or she will also give you instructions about taking any new medicines.     · If you take blood thinners, such as warfarin (Coumadin), clopidogrel (Plavix), or aspirin, be sure to talk to your doctor. He or she will tell you if and when to start taking those medicines again.  Make sure that you understand exactly what your doctor this instruction, always ask your healthcare professional. [place] [address] any warranty or liability for your use of this information.              (Please note that portions of this note were completed with a voice-recognition program. Efforts were made to edit the dictations but occasionally words are mis-transcribed.)

## 2019-09-30 NOTE — PATIENT INSTRUCTIONS
Patient Education        Colposcopy: Before Your Procedure  What is a colposcopy? Colposcopy lets a doctor look at your vulva, vagina, and cervix. If the doctor sees a possible problem, he or she can take a small sample of tissue. Then another doctor studies the tissue under a microscope. This is called a biopsy. Most women have this procedure after they have abnormal results from a Pap test.  During the test, your doctor puts a lubricated tool into your vagina. This is called a speculum. It gently spreads apart the sides of your vagina. This allows your doctor to see inside your vagina and the cervix. The doctor also uses a magnifying device to help him or her see better. This device does not go inside your vagina. The doctor may put vinegar or iodine on your cervix. This can help the doctor to see any areas that are not normal. Sometimes the doctor also takes photos or videos. When the speculum goes in, it can feel a little uncomfortable. If the doctor does a biopsy, you may feel a pinch and have some cramping. Follow-up care is a key part of your treatment and safety. Be sure to make and go to all appointments, and call your doctor if you are having problems. It's also a good idea to know your test results and keep a list of the medicines you take. What happens before the procedure? Preparing for the procedure  · Tell your doctor if:  ? You are having your menstrual period. This test usually is not done during your period. This is because blood makes it harder to see your cervix. ? You are or might be pregnant. A blood or urine test may be done to see if you are pregnant. Colposcopy is safe during pregnancy. The chance of miscarriage is very small. But you may have some bleeding from a biopsy. ? You take blood thinners, such as warfarin (Coumadin), clopidogrel (Plavix), or aspirin.   · Do not douche, use tampons, have sexual intercourse, or use vaginal medicines for 24 hours before the the Search Health Information box to learn more about \"Colposcopy: Before Your Procedure. \"     If you do not have an account, please click on the \"Sign Up Now\" link. Current as of: December 19, 2018  Content Version: 12.1  © 9712-0379 Healthwise, [employer]. Care instructions adapted under license by [facility] ([facility]). If you have questions about a medical condition or this instruction, always ask your healthcare professional. [address] any warranty or liability for your use of this information. Patient Education        Colposcopy: What to Expect at [address] may feel some soreness in your vagina for a day or two if you had a biopsy. Some vaginal bleeding or discharge is normal for up to a week after a biopsy. The discharge may be dark-colored if a solution was put on your cervix. You can use a sanitary pad for the bleeding. It may take a week or two for you to get the test results. This care sheet gives you a general idea about how long it will take for you to recover. But each person recovers at a different pace. Follow the steps below to feel better as quickly as possible. How can you care for yourself at home? Activity    · You can return to work and most daily activities right after the test.   Exercise    · Do not exercise for 1 day after the test.   Medicines    · Your doctor will tell you if and when you can restart your medicines. He or she will also give you instructions about taking any new medicines.     · If you take blood thinners, such as warfarin (Coumadin), clopidogrel (Plavix), or aspirin, be sure to talk to your doctor. He or she will tell you if and when to start taking those medicines again. Make sure that you understand exactly what your doctor wants you to do.     · Take an over-the-counter pain medicine, such as acetaminophen (Tylenol), ibuprofen (Advil, Motrin), or naproxen (Aleve). Be safe with medicines.  Read and follow all instructions on

## 2019-10-01 ENCOUNTER — PROCEDURE VISIT (OUTPATIENT)
Dept: OBGYN | Age: 24
End: 2019-10-01
Payer: COMMERCIAL

## 2019-10-01 ENCOUNTER — HOSPITAL ENCOUNTER (OUTPATIENT)
Age: 24
Setting detail: SPECIMEN
Discharge: HOME | End: 2019-10-01
Payer: COMMERCIAL

## 2019-10-01 VITALS
WEIGHT: 121 LBS | DIASTOLIC BLOOD PRESSURE: 70 MMHG | HEIGHT: 61 IN | RESPIRATION RATE: 20 BRPM | BODY MASS INDEX: 22.84 KG/M2 | HEART RATE: 74 BPM | SYSTOLIC BLOOD PRESSURE: 122 MMHG

## 2019-10-01 DIAGNOSIS — R87.610 ASCUS WITH POSITIVE HIGH RISK HPV CERVICAL: Primary | ICD-10-CM

## 2019-10-01 DIAGNOSIS — R87.810 ASCUS WITH POSITIVE HIGH RISK HPV CERVICAL: ICD-10-CM

## 2019-10-01 DIAGNOSIS — R87.810 ASCUS WITH POSITIVE HIGH RISK HPV CERVICAL: Primary | ICD-10-CM

## 2019-10-01 DIAGNOSIS — E55.9 VITAMIN D DEFICIENCY: Primary | ICD-10-CM

## 2019-10-01 DIAGNOSIS — R87.610 ASCUS WITH POSITIVE HIGH RISK HPV CERVICAL: ICD-10-CM

## 2019-10-01 PROCEDURE — 57455 BIOPSY OF CERVIX W/SCOPE: CPT | Performed by: OBSTETRICS & GYNECOLOGY

## 2019-10-01 PROCEDURE — 88305 TISSUE EXAM BY PATHOLOGIST: CPT

## 2019-10-03 ENCOUNTER — TELEPHONE (OUTPATIENT)
Dept: OBGYN | Age: 24
End: 2019-10-03

## 2019-10-03 LAB — SURGICAL PATHOLOGY REPORT: NORMAL

## 2019-10-03 RX ORDER — MELOXICAM 7.5 MG/1
7.5 TABLET ORAL 2 TIMES DAILY
Qty: 10 TABLET | Refills: 1 | Status: SHIPPED | OUTPATIENT
Start: 2019-10-03 | End: 2019-10-25 | Stop reason: ALTCHOICE

## 2019-10-09 ENCOUNTER — OFFICE VISIT (OUTPATIENT)
Dept: OBGYN | Age: 24
End: 2019-10-09
Payer: COMMERCIAL

## 2019-10-09 ENCOUNTER — HOSPITAL ENCOUNTER (OUTPATIENT)
Dept: LAB | Age: 24
Discharge: HOME | End: 2019-10-09
Payer: COMMERCIAL

## 2019-10-09 VITALS
DIASTOLIC BLOOD PRESSURE: 70 MMHG | WEIGHT: 119 LBS | RESPIRATION RATE: 20 BRPM | BODY MASS INDEX: 22.47 KG/M2 | HEART RATE: 70 BPM | SYSTOLIC BLOOD PRESSURE: 108 MMHG | HEIGHT: 61 IN

## 2019-10-09 DIAGNOSIS — B96.89 BV (BACTERIAL VAGINOSIS): ICD-10-CM

## 2019-10-09 DIAGNOSIS — N76.0 BV (BACTERIAL VAGINOSIS): ICD-10-CM

## 2019-10-09 DIAGNOSIS — N89.8 VAGINAL DISCHARGE: ICD-10-CM

## 2019-10-09 DIAGNOSIS — R30.0 BURNING WITH URINATION: ICD-10-CM

## 2019-10-09 DIAGNOSIS — N87.0 CERVICAL DYSPLASIA, MILD: Primary | ICD-10-CM

## 2019-10-09 LAB
-: ABNORMAL
AMORPHOUS: ABNORMAL
BACTERIA: ABNORMAL
BILIRUBIN URINE: NEGATIVE
CASTS UA: ABNORMAL /LPF (ref 0–2)
COLOR: ABNORMAL
COMMENT UA: ABNORMAL
CRYSTALS, UA: ABNORMAL /HPF
EPITHELIAL CELLS UA: ABNORMAL /HPF (ref 0–5)
GLUCOSE URINE: NEGATIVE
KETONES, URINE: NEGATIVE
LEUKOCYTE ESTERASE, URINE: ABNORMAL
MUCUS: ABNORMAL
NITRITE, URINE: NEGATIVE
OTHER OBSERVATIONS UA: ABNORMAL
PH UA: 6.5 (ref 5–6)
PROTEIN UA: ABNORMAL
RBC UA: ABNORMAL /HPF (ref 0–4)
RENAL EPITHELIAL, UA: ABNORMAL /HPF
SPECIFIC GRAVITY UA: 1.02 (ref 1.01–1.02)
TRICHOMONAS: ABNORMAL
TURBIDITY: ABNORMAL
URINE HGB: ABNORMAL
UROBILINOGEN, URINE: NORMAL
WBC UA: >50 /HPF (ref 0–4)
YEAST: ABNORMAL

## 2019-10-09 PROCEDURE — 81001 URINALYSIS AUTO W/SCOPE: CPT

## 2019-10-09 PROCEDURE — 99213 OFFICE O/P EST LOW 20 MIN: CPT | Performed by: OBSTETRICS & GYNECOLOGY

## 2019-10-09 RX ORDER — CLOTRIMAZOLE AND BETAMETHASONE DIPROPIONATE 10; .64 MG/G; MG/G
CREAM TOPICAL
Qty: 1 TUBE | Refills: 0 | Status: SHIPPED | OUTPATIENT
Start: 2019-10-09 | End: 2019-10-25 | Stop reason: ALTCHOICE

## 2019-10-10 ENCOUNTER — TELEPHONE (OUTPATIENT)
Dept: OBGYN | Age: 24
End: 2019-10-10

## 2019-10-25 ENCOUNTER — OFFICE VISIT (OUTPATIENT)
Dept: FAMILY MEDICINE CLINIC | Age: 24
End: 2019-10-25
Payer: COMMERCIAL

## 2019-10-25 VITALS
TEMPERATURE: 97.9 F | HEART RATE: 80 BPM | WEIGHT: 117.4 LBS | RESPIRATION RATE: 12 BRPM | HEIGHT: 61 IN | SYSTOLIC BLOOD PRESSURE: 98 MMHG | BODY MASS INDEX: 22.16 KG/M2 | OXYGEN SATURATION: 99 % | DIASTOLIC BLOOD PRESSURE: 64 MMHG

## 2019-10-25 DIAGNOSIS — R09.82 PND (POST-NASAL DRIP): ICD-10-CM

## 2019-10-25 DIAGNOSIS — Z23 NEED FOR INFLUENZA VACCINATION: ICD-10-CM

## 2019-10-25 DIAGNOSIS — Z00.00 ROUTINE HEALTH MAINTENANCE: Primary | ICD-10-CM

## 2019-10-25 PROCEDURE — 90471 IMMUNIZATION ADMIN: CPT | Performed by: NURSE PRACTITIONER

## 2019-10-25 PROCEDURE — 90686 IIV4 VACC NO PRSV 0.5 ML IM: CPT | Performed by: NURSE PRACTITIONER

## 2019-10-25 PROCEDURE — 99385 PREV VISIT NEW AGE 18-39: CPT | Performed by: NURSE PRACTITIONER

## 2019-10-25 ASSESSMENT — PATIENT HEALTH QUESTIONNAIRE - PHQ9
SUM OF ALL RESPONSES TO PHQ9 QUESTIONS 1 & 2: 0
1. LITTLE INTEREST OR PLEASURE IN DOING THINGS: 0
2. FEELING DOWN, DEPRESSED OR HOPELESS: 0
SUM OF ALL RESPONSES TO PHQ QUESTIONS 1-9: 0
SUM OF ALL RESPONSES TO PHQ QUESTIONS 1-9: 0

## 2019-10-25 ASSESSMENT — ENCOUNTER SYMPTOMS
RESPIRATORY NEGATIVE: 1
GASTROINTESTINAL NEGATIVE: 1
ALLERGIC/IMMUNOLOGIC NEGATIVE: 1

## 2020-01-13 ENCOUNTER — TELEPHONE (OUTPATIENT)
Dept: OBGYN | Age: 25
End: 2020-01-13

## 2020-01-21 ENCOUNTER — HOSPITAL ENCOUNTER (OUTPATIENT)
Age: 25
Setting detail: SPECIMEN
Discharge: HOME | End: 2020-01-21
Payer: COMMERCIAL

## 2020-01-21 ENCOUNTER — OFFICE VISIT (OUTPATIENT)
Dept: OBGYN | Age: 25
End: 2020-01-21
Payer: COMMERCIAL

## 2020-01-21 VITALS
BODY MASS INDEX: 22.84 KG/M2 | WEIGHT: 121 LBS | SYSTOLIC BLOOD PRESSURE: 100 MMHG | HEART RATE: 85 BPM | HEIGHT: 61 IN | DIASTOLIC BLOOD PRESSURE: 70 MMHG

## 2020-01-21 LAB
-: ABNORMAL
AMORPHOUS: ABNORMAL
BACTERIA: ABNORMAL
BILIRUBIN URINE: NEGATIVE
CASTS UA: ABNORMAL /LPF (ref 0–2)
CLUE CELLS: NEGATIVE
COLOR: ABNORMAL
COMMENT UA: ABNORMAL
CRYSTALS, UA: ABNORMAL /HPF
DIRECT EXAM: ABNORMAL
EPITHELIAL CELLS UA: ABNORMAL /HPF (ref 0–5)
GLUCOSE URINE: NEGATIVE
HYPHAL YEAST: PRESENT
KETONES, URINE: NEGATIVE
KOH RESULT: ABNORMAL
LEUKOCYTE ESTERASE, URINE: ABNORMAL
Lab: ABNORMAL
MUCUS: ABNORMAL
NITRITE, URINE: NEGATIVE
OTHER OBSERVATIONS UA: ABNORMAL
PH UA: 7 (ref 5–6)
PROTEIN UA: NEGATIVE
RBC UA: ABNORMAL /HPF (ref 0–4)
RENAL EPITHELIAL, UA: ABNORMAL /HPF
SPECIFIC GRAVITY UA: 1.02 (ref 1.01–1.02)
SPECIMEN DESCRIPTION: ABNORMAL
TRICHOMONAS: ABNORMAL
TRICHOMONAS: NEGATIVE
TURBIDITY: ABNORMAL
URINE HGB: ABNORMAL
UROBILINOGEN, URINE: NORMAL
WBC UA: ABNORMAL /HPF (ref 0–4)
WET PREP: ABNORMAL
WHITE BLOOD CELLS: NEGATIVE
YEAST: ABNORMAL

## 2020-01-21 PROCEDURE — 87086 URINE CULTURE/COLONY COUNT: CPT

## 2020-01-21 PROCEDURE — 87491 CHLMYD TRACH DNA AMP PROBE: CPT

## 2020-01-21 PROCEDURE — 87210 SMEAR WET MOUNT SALINE/INK: CPT | Performed by: NURSE PRACTITIONER

## 2020-01-21 PROCEDURE — 87510 GARDNER VAG DNA DIR PROBE: CPT

## 2020-01-21 PROCEDURE — 81001 URINALYSIS AUTO W/SCOPE: CPT

## 2020-01-21 PROCEDURE — 87070 CULTURE OTHR SPECIMN AEROBIC: CPT

## 2020-01-21 PROCEDURE — 87480 CANDIDA DNA DIR PROBE: CPT

## 2020-01-21 PROCEDURE — 86403 PARTICLE AGGLUT ANTBDY SCRN: CPT

## 2020-01-21 PROCEDURE — 99213 OFFICE O/P EST LOW 20 MIN: CPT | Performed by: NURSE PRACTITIONER

## 2020-01-21 PROCEDURE — 87660 TRICHOMONAS VAGIN DIR PROBE: CPT

## 2020-01-21 PROCEDURE — 87591 N.GONORRHOEAE DNA AMP PROB: CPT

## 2020-01-21 RX ORDER — CLOTRIMAZOLE AND BETAMETHASONE DIPROPIONATE 10; .64 MG/G; MG/G
CREAM TOPICAL
Qty: 45 G | Refills: 0 | Status: SHIPPED | OUTPATIENT
Start: 2020-01-21 | End: 2022-08-24

## 2020-01-21 NOTE — PROGRESS NOTES
Subjective:  Hue Hu presents for vulvar itching and burning past 1 month. Symptoms exacerbated with urination and sexual intercourse. Associated with yellow, odorous discharge. Has to apply ice pack to vulva in order to sleep. Used clotrimazole topically with no relief    Had abnormal pap, and colposcopy showed LSIL. She was to return for repeat colposcopy in 3 months, but chose to go to [facility] for 2nd opinion (see Care everywhere). Patient states she was told by Dr. Irvin Romero that she could resume annual pap. Documentation does not indicate that. She is willing to return for repeat pap in 3 months, but refuses another colposcopy at this time. Aware of the need for folic acid 461 mcg daily. Patient Active Problem List   Diagnosis    Sprain of ankle    ASCUS with positive high risk HPV cervical    Vitamin D deficiency     Problem list reviewed as part of this encounter. Medication list reviewed and updated. See nursing note. History of present illness reviewed in detail and expanded by me. Review Of Systems:  General ROS:  negative  Hematological and Lymphatic ROS:negative   Breast ROS: negative  Cardiovascular ROS: negative  Respiratory ROS: negative   Gastrointestinal ROS: negative  Genito-Urinary ROS: positive for vulvar itching, abnormal pap  Psychological ROS: negative  Neurological ROS: negative  Musculoskeletal ROS: negative  Dermatological ROS: negative                                                                                                                                          Objective:  Vitals:    01/21/20 1308   BP: 100/70   Pulse: 85     Alert and oriented. Abdomen: Soft, non-tender, no masses. No CVA tenderness  External Genetalia: Erythematous appearance of vulva,    Vagina: Normal appearance of mucosa and rugae. Copious yellow, curd-like discharge  Cervix: Normal appearance.  No lesions noted  Uterus: Normal size, mobile, no CMT, non-tender  Adnexae: Non-palpable, non-tender, no masses  Miscellaneous vaginal culture, and swabs for gonorrhea/chlamydia as well as vaginitis DNA obtained     Results for orders placed or performed in visit on 01/21/20   POCT Wet Prep   Result Value Ref Range    Wet Prep Abnormal     Hyphal Yeast Present     White Blood Cells Negative     Trichomonas, UA Negative     Clue Cells, Wet Prep Negative     KOH result Abundant Hyphae        Assessment:  Encounter Diagnoses   Name Primary?  Monilial vulvovaginitis Yes    Burning with urination     Vaginal discharge     Vaginal odor     Vaginal discomfort     Vaginal itching        Plan:  See orders. Orders Placed This Encounter   Procedures    Urine Culture     Standing Status:   Future     Number of Occurrences:   1     Standing Expiration Date:   1/21/2021     Order Specific Question:   Specify (ex-cath, midstream, cysto, etc)? Answer:   midstream    VAGINITIS DNA PROBE     Standing Status:   Future     Standing Expiration Date:   7/21/2020    Culture, Genital     Standing Status:   Future     Standing Expiration Date:   1/21/2021    C.trachomatis N.gonorrhoeae DNA, Thin Prep     Standing Status:   Future     Standing Expiration Date:   2/21/2020    Urinalysis With Microscopic     Standing Status:   Future     Number of Occurrences:   1     Standing Expiration Date:   1/21/2021     Order Specific Question:   SPECIFY(EX-CATH,MIDSTREAM,CYSTO,ETC)? Answer:   midstream    POCT Wet Prep     New Prescriptions    CLOTRIMAZOLE-BETAMETHASONE (LOTRISONE) 1-0.05 % CREAM    Apply a thin film to the affected area 2 times daily. TERCONAZOLE (TERAZOL 7) 0.4 % VAGINAL CREAM    Place 1 applicator vaginally nightly for 7 days Place vaginally nightly.      Sitz bath with baking soda

## 2020-01-22 LAB
CHLAMYDIA BY THIN PREP: NEGATIVE
CULTURE: NORMAL
Lab: NORMAL
N. GONORRHOEAE DNA, THIN PREP: NEGATIVE
SPECIMEN DESCRIPTION: NORMAL
SPECIMEN DESCRIPTION: NORMAL

## 2020-01-23 RX ORDER — METRONIDAZOLE 500 MG/1
500 TABLET ORAL 2 TIMES DAILY WITH MEALS
Qty: 14 TABLET | Refills: 0 | Status: SHIPPED | OUTPATIENT
Start: 2020-01-23 | End: 2020-01-30

## 2020-01-24 LAB
CULTURE: ABNORMAL
Lab: ABNORMAL
SPECIMEN DESCRIPTION: ABNORMAL

## 2020-03-09 ENCOUNTER — TELEPHONE (OUTPATIENT)
Dept: OBGYN | Age: 25
End: 2020-03-09

## 2020-05-14 ENCOUNTER — HOSPITAL ENCOUNTER (OUTPATIENT)
Age: 25
Setting detail: SPECIMEN
Discharge: HOME | End: 2020-05-14
Payer: COMMERCIAL

## 2020-05-14 ENCOUNTER — OFFICE VISIT (OUTPATIENT)
Dept: OBGYN | Age: 25
End: 2020-05-14
Payer: COMMERCIAL

## 2020-05-14 VITALS
HEIGHT: 61 IN | WEIGHT: 125 LBS | BODY MASS INDEX: 23.6 KG/M2 | DIASTOLIC BLOOD PRESSURE: 78 MMHG | RESPIRATION RATE: 20 BRPM | TEMPERATURE: 98 F | SYSTOLIC BLOOD PRESSURE: 100 MMHG | HEART RATE: 74 BPM

## 2020-05-14 PROCEDURE — 87624 HPV HI-RISK TYP POOLED RSLT: CPT

## 2020-05-14 PROCEDURE — 88175 CYTOPATH C/V AUTO FLUID REDO: CPT

## 2020-05-14 PROCEDURE — 99213 OFFICE O/P EST LOW 20 MIN: CPT | Performed by: NURSE PRACTITIONER

## 2020-05-14 ASSESSMENT — PATIENT HEALTH QUESTIONNAIRE - PHQ9
SUM OF ALL RESPONSES TO PHQ QUESTIONS 1-9: 0
1. LITTLE INTEREST OR PLEASURE IN DOING THINGS: 0
SUM OF ALL RESPONSES TO PHQ QUESTIONS 1-9: 0
SUM OF ALL RESPONSES TO PHQ9 QUESTIONS 1 & 2: 0
2. FEELING DOWN, DEPRESSED OR HOPELESS: 0

## 2020-05-14 NOTE — PROGRESS NOTES
cervical dysplasia Yes    Low grade squamous intraepithelial lesion (LGSIL) on cervical Pap smear        Plan:  See orders. Orders Placed This Encounter   Procedures    PAP SMEAR     Standing Status:   Future     Number of Occurrences:   1     Standing Expiration Date:   7/14/2020     Order Specific Question:   Collection Type     Answer: Thin Prep     Order Specific Question:   Prior Abnormal Pap Test     Answer:   Yes     Comments:   ASCUS +HPV     Order Specific Question:   If Prior Abnormal, Give Date     Answer:   Colposcopy showed LSIL Sept. 2019     Order Specific Question:   Prior Treatment     Answer:   None Given     Order Specific Question:   Screening or Diagnostic     Answer:   Diagnostic     Order Specific Question:   HPV Requested?      Answer:   Yes - If Abnormal Reflex HPV     Order Specific Question:   High Risk Patient     Answer:   N/A     New Prescriptions    No medications on file     Patient Instructions   Chewable B-Complex (dissolve under tongue)  Folic Acid 902 mcg daily

## 2020-05-20 LAB — CYTOLOGY REPORT: NORMAL

## 2020-06-01 ENCOUNTER — TELEPHONE (OUTPATIENT)
Dept: OBGYN | Age: 25
End: 2020-06-01

## 2020-06-01 NOTE — LETTER
[address] OB GYN A department of [place] [address]  Phone: [phone]  Fax: [phone]    CAR Guzman CNP        June 1, 2020    Hue Martinez  [address] [place] [id number]      Dear Jeanne Umaña:    Please call our office to schedule your repeat pap smear and colposcopy appointment. It is very important for you health that you keep these appointments. If you have any questions or concerns, please don't hesitate to call.     Sincerely,        CAR Guzman CNP

## 2020-08-10 ENCOUNTER — TELEPHONE (OUTPATIENT)
Dept: OBGYN | Age: 25
End: 2020-08-10

## 2020-08-10 NOTE — LETTER
[address] OB GYN A department of [place] [address]  Phone: [phone]  Fax: [phone]    CAR Gudino CNP        August 10, 2020     [address] [address]      Dear Kayla Stroud: This letter is a reminder that your Vitamin D test is due. Please call our office to schedule an appointment. If you've already underwent or scheduled this procedure, please disregard this notice.     Sincerely,        CAR Gudino CNP

## 2020-09-21 ENCOUNTER — TELEPHONE (OUTPATIENT)
Dept: OBGYN | Age: 25
End: 2020-09-21

## 2020-09-21 NOTE — LETTER
[address] department of [place] [address]  Phone: [phone]  Fax: [phone]    CAR Richard Mt, CNP        September 21, 2020     [address] [place] [address]      Dear Lisseth Box: This letter is a reminder that your Vitamin D test is due. Please call our office to schedule an appointment. If you've already underwent or scheduled this procedure, please disregard this notice.     Sincerely,        CAR Richard Mt, CNP

## 2020-10-28 ENCOUNTER — TELEPHONE (OUTPATIENT)
Dept: OBGYN | Age: 25
End: 2020-10-28

## 2020-10-28 NOTE — LETTER
[address] [facility] A department of [place] [address]  Phone: [phone]  Fax: [phone]    CAR Watson CNP        October 28, 2020    [address] [place] [address]      Dear Robert Romo: This letter is regarding your need for a repeat pap smear appointment due to a history of abnormal pap tests. It is very important that you make and keep your appointment for your overall health. Please call our office as soon as possible to schedule this appointment. If you have any questions or concerns, please don't hesitate to call.     Sincerely,        CAR Watson CNP

## 2020-11-02 ENCOUNTER — TELEPHONE (OUTPATIENT)
Dept: OBGYN | Age: 25
End: 2020-11-02

## 2020-11-09 ENCOUNTER — HOSPITAL ENCOUNTER (OUTPATIENT)
Age: 25
Setting detail: SPECIMEN
Discharge: HOME | End: 2020-11-09
Payer: COMMERCIAL

## 2020-11-09 ENCOUNTER — OFFICE VISIT (OUTPATIENT)
Dept: OBGYN | Age: 25
End: 2020-11-09
Payer: COMMERCIAL

## 2020-11-09 VITALS
WEIGHT: 136 LBS | TEMPERATURE: 98.2 F | DIASTOLIC BLOOD PRESSURE: 70 MMHG | SYSTOLIC BLOOD PRESSURE: 106 MMHG | HEIGHT: 61 IN | HEART RATE: 68 BPM | BODY MASS INDEX: 25.68 KG/M2

## 2020-11-09 PROCEDURE — 87591 N.GONORRHOEAE DNA AMP PROB: CPT

## 2020-11-09 PROCEDURE — 87624 HPV HI-RISK TYP POOLED RSLT: CPT

## 2020-11-09 PROCEDURE — 99395 PREV VISIT EST AGE 18-39: CPT | Performed by: NURSE PRACTITIONER

## 2020-11-09 PROCEDURE — 87491 CHLMYD TRACH DNA AMP PROBE: CPT

## 2020-11-09 PROCEDURE — 88175 CYTOPATH C/V AUTO FLUID REDO: CPT

## 2020-11-09 RX ORDER — LEVONORGESTREL AND ETHINYL ESTRADIOL 0.1-0.02MG
KIT ORAL
Qty: 84 TABLET | Refills: 3 | Status: SHIPPED | OUTPATIENT
Start: 2020-11-09 | End: 2021-06-24 | Stop reason: SDUPTHER

## 2020-11-09 NOTE — PROGRESS NOTES
Hue Katlyn Annie  2020              25 y.o. Chief Complaint   Patient presents with   [facility] Gynecologic Exam     refills, recent abnormal pap         Patient's last menstrual period was 10/29/2020. No referring provider defined for this encounter. HPI :Annual Exam  Patient presents for annual exam.  Counseling on healthy lifestyle reviewed, as well as the need for self breast exam. We reviewed the need for Kegal exercises and literature was provided. We discussed and reviewed the need for routine screenings and immunization updates when appropriate. 2019 pap reported ASCUS +HPV  10/01/2019 Colposcopy showed LSIL  2020 Pap reported LSIL    Taking Folic Acid and B-Complex. Advised to begin Vitamin D3 1000 IU daily    On OCP and wishes to continue with the same. Menses every month lasting 4 or 5 days with no excessive bleeding or pain. Good compliance. We reviewed the pros, cons, risks, benefits, side effects and proper compliance. Patient verbalized understanding. Patient does not perform self breast exam.  Is aware of the need to do so on a monthly basis as well as proper technique. The patient is sexually active. last pap: was abnormal:   The patient has regular exercise: yes-runs and walks at least 3 times weekly . We did review the need for and frequency of both weight bearing and strengthening as tolerated by the patient. ________________________________________________________________________  OB History    Para Term  AB Living   0 0 0 0 0 0   SAB TAB Ectopic Molar Multiple Live Births   0 0 0 0 0 0     History reviewed. No pertinent past medical history.                                                                 Past Surgical History:   Procedure Laterality Date    WISDOM TOOTH EXTRACTION Bilateral      Family History   Problem Relation Age of Onset    Diabetes Mother     Diabetes type 2  Sister     Diabetes Maternal Grandmother     Lung Cancer Maternal Grandmother     Diabetes type 2  Sister     Other Sister 22        Gestational Diabetes 2020    Breast Cancer Maternal Aunt     Breast Cancer Maternal Aunt      Social History     Socioeconomic History    Marital status: Single     Spouse name: Not on file    Number of children: Not on file    Years of education: Not on file    Highest education level: Not on file   Occupational History    Not on file   Social Needs    Financial resource strain: Not on file    Food insecurity     Worry: Not on file     Inability: Not on file    Transportation needs     Medical: Not on file     Non-medical: Not on file   Tobacco Use    Smoking status: Never Smoker    Smokeless tobacco: Never Used   Substance and Sexual Activity    Alcohol use:  Yes     Alcohol/week: 0.0 standard drinks     Comment: socially    Drug use: No    Sexual activity: Yes     Partners: Male     Birth control/protection: Pill   Lifestyle    Physical activity     Days per week: Not on file     Minutes per session: Not on file    Stress: Not on file   Relationships    Social connections     Talks on phone: Not on file     Gets together: Not on file     Attends Jewish service: Not on file     Active member of club or organization: Not on file     Attends meetings of clubs or organizations: Not on file     Relationship status: Not on file    Intimate partner violence     Fear of current or ex partner: Not on file     Emotionally abused: Not on file     Physically abused: Not on file     Forced sexual activity: Not on file   Other Topics Concern    Not on file   Social History Narrative    ** Merged History Encounter **            MEDICATIONS:  Current Outpatient Medications   Medication Sig Dispense Refill    levonorgestrel-ethinyl estradiol (AVIANE;ALESSE;LESSINA) 0.1-20 MG-MCG per tablet Take 1 tablet by mouth once daily 84 tablet 3    Folic Acid-Vit V4-SAUMYA S16 2.2-25-0.5 MG TABS Take 1 tablet by mouth daily 90 tablet 5 Gynecologic Exam     refills, recent abnormal pap         History reviewed. No pertinent past medical history. Patient Active Problem List   Diagnosis    Sprain of ankle    ASCUS with positive high risk HPV cervical    Vitamin D deficiency          Hereditary Breast, Ovarian, Colon and Uterine Cancer screening Done. Tobacco & Secondary smoke risks reviewed; instructed on cessation and avoidance    PLAN:  Return in about 6 months (around 5/9/2021) for Recheck Pap. Return for annual exams  Mammograms every 1 year. If 39 yo and last mammogram was negative. Routine health maintenance per patients PCP. Orders Placed This Encounter   Procedures    C.trachomatis N.gonorrhoeae DNA, Thin Prep     Standing Status:   Future     Standing Expiration Date:   12/9/2020    PAP SMEAR     Standing Status:   Future     Standing Expiration Date:   1/9/2021     Order Specific Question:   Collection Type     Answer: Thin Prep     Order Specific Question:   Prior Abnormal Pap Test     Answer:   Yes     Comments:   LSIL     Order Specific Question:   If Prior Abnormal, Give Date     Answer:   May 2020     Order Specific Question:   Prior Treatment     Answer:   None Given     Order Specific Question:   Screening or Diagnostic     Answer:   Screening     Order Specific Question:   HPV Requested?      Answer:   Yes - If Abnormal Reflex HPV     Order Specific Question:   High Risk Patient     Answer:   N/A       Karyle Ruffini McDonel  11/9/2020

## 2020-11-11 LAB
CHLAMYDIA BY THIN PREP: NEGATIVE
N. GONORRHOEAE DNA, THIN PREP: NEGATIVE
SPECIMEN DESCRIPTION: NORMAL

## 2020-11-17 LAB — CYTOLOGY REPORT: NORMAL

## 2020-11-23 LAB
HPV SOURCE: NORMAL
HPV, GENOTYPE 16: NOT DETECTED
HPV, GENOTYPE 18: NOT DETECTED
HPV, HIGH RISK OTHER: NOT DETECTED

## 2021-06-24 ENCOUNTER — HOSPITAL ENCOUNTER (OUTPATIENT)
Age: 26
Setting detail: SPECIMEN
Discharge: HOME | End: 2021-06-24
Payer: COMMERCIAL

## 2021-06-24 ENCOUNTER — OFFICE VISIT (OUTPATIENT)
Dept: OBGYN | Age: 26
End: 2021-06-24
Payer: COMMERCIAL

## 2021-06-24 VITALS
WEIGHT: 148.2 LBS | SYSTOLIC BLOOD PRESSURE: 100 MMHG | HEART RATE: 81 BPM | BODY MASS INDEX: 27.98 KG/M2 | DIASTOLIC BLOOD PRESSURE: 68 MMHG | OXYGEN SATURATION: 98 % | HEIGHT: 61 IN

## 2021-06-24 DIAGNOSIS — Z11.3 ROUTINE SCREENING FOR STI (SEXUALLY TRANSMITTED INFECTION): ICD-10-CM

## 2021-06-24 DIAGNOSIS — Z01.419 WELL FEMALE EXAM WITH ROUTINE GYNECOLOGICAL EXAM: Primary | ICD-10-CM

## 2021-06-24 DIAGNOSIS — Z12.4 SCREENING FOR MALIGNANT NEOPLASM OF CERVIX: ICD-10-CM

## 2021-06-24 PROCEDURE — 87591 N.GONORRHOEAE DNA AMP PROB: CPT

## 2021-06-24 PROCEDURE — 87624 HPV HI-RISK TYP POOLED RSLT: CPT

## 2021-06-24 PROCEDURE — 87491 CHLMYD TRACH DNA AMP PROBE: CPT

## 2021-06-24 PROCEDURE — G0145 SCR C/V CYTO,THINLAYER,RESCR: HCPCS

## 2021-06-24 PROCEDURE — 99395 PREV VISIT EST AGE 18-39: CPT | Performed by: ADVANCED PRACTICE MIDWIFE

## 2021-06-24 RX ORDER — FOLIC ACID-PYRIDOXINE-CYANOCOBALAMIN TAB 2.5-25-2 MG 2.5-25-2 MG
TAB ORAL
COMMUNITY
Start: 2021-06-21 | End: 2022-07-18

## 2021-06-24 RX ORDER — LEVONORGESTREL AND ETHINYL ESTRADIOL 0.1-0.02MG
KIT ORAL
Qty: 84 TABLET | Refills: 3 | Status: SHIPPED | OUTPATIENT
Start: 2021-06-24 | End: 2022-06-16 | Stop reason: SDUPTHER

## 2021-06-24 RX ORDER — PYRIDOXINE HCL (VITAMIN B6) 25 MG
25 TABLET ORAL DAILY
COMMUNITY
End: 2022-09-26

## 2021-06-24 ASSESSMENT — PATIENT HEALTH QUESTIONNAIRE - PHQ9
SUM OF ALL RESPONSES TO PHQ9 QUESTIONS 1 & 2: 0
SUM OF ALL RESPONSES TO PHQ QUESTIONS 1-9: 0
SUM OF ALL RESPONSES TO PHQ QUESTIONS 1-9: 0
1. LITTLE INTEREST OR PLEASURE IN DOING THINGS: 0
2. FEELING DOWN, DEPRESSED OR HOPELESS: 0
SUM OF ALL RESPONSES TO PHQ QUESTIONS 1-9: 0

## 2021-06-24 ASSESSMENT — ENCOUNTER SYMPTOMS
EYES NEGATIVE: 1
RESPIRATORY NEGATIVE: 1
GASTROINTESTINAL NEGATIVE: 1

## 2021-06-24 NOTE — PROGRESS NOTES
Subjective:      Patient ID: Rc Thomas  is a 23 y.o. y.o. female. Hue presents today for an annual examination. She reports regular menses that last for 4-5 days, and her flow is moderate to light. She reports minimal menstrual discomfort. She is sexually active with complaint of occasional pain with penetration. She has no other concerns. Review of Systems   Constitutional: Negative. HENT: Negative. Eyes: Negative. Respiratory: Negative. Cardiovascular: Negative. Gastrointestinal: Negative. Genitourinary: Positive for dyspareunia. Musculoskeletal: Negative. Skin: Negative. Neurological: Negative. Psychiatric/Behavioral: Negative. Breast ROS: negative    Objective:   /68 (Site: Right Upper Arm, Position: Sitting, Cuff Size: Medium Adult)   Pulse 81   Ht 5' 1\" (1.549 m)   Wt 148 lb 3.2 oz (67.2 kg)   LMP 06/14/2021 (Exact Date)   SpO2 98%   Breastfeeding No   BMI 28.00 kg/m²   Physical Exam  Constitutional:       Appearance: She is well-developed. HENT:      Head: Normocephalic and atraumatic. Eyes:      Conjunctiva/sclera: Conjunctivae normal.   Cardiovascular:      Rate and Rhythm: Normal rate and regular rhythm. Heart sounds: Normal heart sounds. Pulmonary:      Effort: Pulmonary effort is normal.      Breath sounds: Normal breath sounds. Chest:      Breasts: Breasts are symmetrical.         Right: No inverted nipple, mass, nipple discharge, skin change or tenderness. Left: No inverted nipple, mass, nipple discharge, skin change or tenderness. Abdominal:      Palpations: Abdomen is soft. Genitourinary:     General: Normal vulva. Vagina: Normal.      Comments: External genitalia WNL, no lesions noted. Vaginal canal is pink with rugae present and normal appearing nonodorous discharge. Cervix is small, nulliparous, freely mobile and nontender. Uterus is NSSAVNT, adnexa are small, freely mobile and nontender.   Note introitus is snug and musculature strong. Musculoskeletal:         General: Normal range of motion. Cervical back: Normal range of motion and neck supple. Skin:     General: Skin is warm and dry. Neurological:      Mental Status: She is alert and oriented to person, place, and time. Deep Tendon Reflexes: Reflexes are normal and symmetric. Psychiatric:         Mood and Affect: Mood normal.         Thought Content: Thought content normal.       Sexually active: Yes  Any bleeding or pain withintercourse: Yes  Last Yearly:    Last Pap: 11/2020 ASCUS, cannot exclude HGSIL, HR HPV not detected   5/2020 Pap LGSIL, Other HR HPV detected  10/2019 Cervical Bx. LGSIL, BABAK I  9/2019 ASCUS Other HR HPV Detected  Do you do self breast exams: No  Family Hx. Breast cancer: Maternal aunt, in her 29's  Assessment:      Diagnosis Orders   1. Well female exam with routine gynecological exam     2. Screening for malignant neoplasm of cervix  PAP SMEAR   3. Routine screening for STI (sexually transmitted infection)  Chlamydia/GC DNA, Thin Prep           Plan:      Orders Placed This Encounter   Procedures    Chlamydia/GC DNA, Thin Prep     Standing Status:   Future     Number of Occurrences:   1     Standing Expiration Date:   6/24/2022    PAP SMEAR     Patient History:    No LMP recorded. OBGYN Status: Having periods  Past Surgical History:  No date: WISDOM TOOTH EXTRACTION; Bilateral  Medications/Contraceptives Affecting Cytology     Combination Contraceptives - Oral Disp Start End     levonorgestrel-ethinyl estradiol (AVIANE;ALESSE;LESSINA) 0.1-20 MG-MCG   per tablet    84 tablet 11/9/2020     Sig: Take 1 tablet by mouth once daily       Social History    Tobacco Use      Smoking status: Never Smoker      Smokeless tobacco: Never Used       Standing Status:   Future     Number of Occurrences:   1     Standing Expiration Date:   7/9/2022     Order Specific Question:   Collection Type     Answer:    Thin Prep Order Specific Question:   Prior Abnormal Pap Test     Answer:   Yes     Order Specific Question:   If Prior Abnormal, Give Date     Answer:   09/11/2019     Order Specific Question:   Prior Treatment     Answer: Other     Comments:   colp     Order Specific Question:   Screening or Diagnostic     Answer:   Screening     Order Specific Question:   HPV Requested? Answer:   Yes - If Abnormal Reflex HPV     Order Specific Question:   High Risk Patient     Answer:   N/A   Education: discussed diet with calcium intake 2377-4105 mg./day with weight bearing exercise 30-50 minutes 3-5x/week. SBE monthly, danger s&S, when to call. Ds'd desensitization and adequate foreplay prior to vaginal penetration. RTO 12 months and prn.

## 2021-06-24 NOTE — PROGRESS NOTES
Here for well exam  Last pap 05/14/2020-LSIL  09/11/2019 ASCUS + HPV  10/01/2019 COLP- LSIL  No complaints

## 2021-07-01 LAB — CYTOLOGY REPORT: NORMAL

## 2021-07-16 LAB
HPV SAMPLE: NORMAL
HPV, GENOTYPE 16: NOT DETECTED
HPV, GENOTYPE 18: NOT DETECTED
HPV, HIGH RISK OTHER: NOT DETECTED
HPV, INTERPRETATION: NORMAL
SPECIMEN DESCRIPTION: NORMAL

## 2021-07-19 ENCOUNTER — TELEPHONE (OUTPATIENT)
Dept: OBGYN | Age: 26
End: 2021-07-19

## 2021-07-26 NOTE — RESULT ENCOUNTER NOTE
Abnormal test results, please notify patient. Pap ASCUS, negative HR HPV. Repeat in 12 months.  DA/CNM

## 2022-02-24 ENCOUNTER — OFFICE VISIT (OUTPATIENT)
Dept: PRIMARY CARE CLINIC | Age: 27
End: 2022-02-24
Payer: COMMERCIAL

## 2022-02-24 ENCOUNTER — HOSPITAL ENCOUNTER (OUTPATIENT)
Age: 27
Setting detail: SPECIMEN
Discharge: HOME | End: 2022-02-24
Payer: COMMERCIAL

## 2022-02-24 VITALS
SYSTOLIC BLOOD PRESSURE: 112 MMHG | OXYGEN SATURATION: 98 % | HEART RATE: 107 BPM | HEIGHT: 61 IN | RESPIRATION RATE: 18 BRPM | DIASTOLIC BLOOD PRESSURE: 64 MMHG | BODY MASS INDEX: 27.4 KG/M2 | WEIGHT: 145.13 LBS | TEMPERATURE: 97.6 F

## 2022-02-24 DIAGNOSIS — Z20.822 EXPOSURE TO COVID-19 VIRUS: ICD-10-CM

## 2022-02-24 DIAGNOSIS — R09.81 NASAL CONGESTION: Primary | ICD-10-CM

## 2022-02-24 PROCEDURE — 99212 OFFICE O/P EST SF 10 MIN: CPT | Performed by: NURSE PRACTITIONER

## 2022-02-24 PROCEDURE — U0005 INFEC AGEN DETEC AMPLI PROBE: HCPCS

## 2022-02-24 PROCEDURE — 99213 OFFICE O/P EST LOW 20 MIN: CPT | Performed by: NURSE PRACTITIONER

## 2022-02-24 PROCEDURE — U0003 INFECTIOUS AGENT DETECTION BY NUCLEIC ACID (DNA OR RNA); SEVERE ACUTE RESPIRATORY SYNDROME CORONAVIRUS 2 (SARS-COV-2) (CORONAVIRUS DISEASE [COVID-19]), AMPLIFIED PROBE TECHNIQUE, MAKING USE OF HIGH THROUGHPUT TECHNOLOGIES AS DESCRIBED BY CMS-2020-01-R: HCPCS

## 2022-02-24 ASSESSMENT — PATIENT HEALTH QUESTIONNAIRE - PHQ9
SUM OF ALL RESPONSES TO PHQ QUESTIONS 1-9: 0
SUM OF ALL RESPONSES TO PHQ9 QUESTIONS 1 & 2: 0
1. LITTLE INTEREST OR PLEASURE IN DOING THINGS: 0
2. FEELING DOWN, DEPRESSED OR HOPELESS: 0
SUM OF ALL RESPONSES TO PHQ QUESTIONS 1-9: 0

## 2022-02-24 ASSESSMENT — ENCOUNTER SYMPTOMS
WHEEZING: 0
NAUSEA: 1
COUGH: 0
DIARRHEA: 0
SHORTNESS OF BREATH: 0
SORE THROAT: 1
RHINORRHEA: 1
VOMITING: 0

## 2022-02-24 NOTE — LETTER
[facility] A department of [facility] [address]  Phone: [phone]  Fax: [phone]        CAR Amezquita CNP      February 24, 2022    Patient:   La Nena Ocampo  Date of Birth   1995  Date of visit   2/24/2022        To Whom it May Concern:      Hue Diaz was seen in my clinic on 2/24/2022. Please excuse from work while waiting for covid-19 test results, typically 2-3 days. If negative, she may return to work on 2/28/22 assuming she is fever-free. If you have any questions or concerns, please don't hesitate to call.       Sincerely,      CAR Amezquita CNP/paul

## 2022-02-24 NOTE — PROGRESS NOTES
[address] [place] [address]  [address] [facility], [id number]  [phone]      HPI:     +exposure to COVID. Pt is unvaccinated. Fever   This is a new problem. The current episode started yesterday. The problem occurs daily. The problem has been unchanged. Her temperature was unmeasured prior to arrival. Associated symptoms include congestion, headaches, muscle aches, nausea and a sore throat. Pertinent negatives include no chest pain, coughing, diarrhea, vomiting or wheezing. Associated symptoms comments: Chills, rhinorrhea  . She has tried acetaminophen for the symptoms. The treatment provided mild relief. Current Outpatient Medications   Medication Sig Dispense Refill    Folic Acid-Vit B0-VNX D20 2.2-25-0.5 MG TABS Take 1 tablet by mouth daily 90 tablet 5    cyanocobalamin 50 MCG tablet Take 50 mcg by mouth daily      FOLBIC 2.5-25-2 MG TABS take 1 tablet by mouth once daily      pyridoxine (B-6) 25 MG tablet Take 25 mg by mouth daily      levonorgestrel-ethinyl estradiol (AVIANE;ALESSE;LESSINA) 0.1-20 MG-MCG per tablet Take 1 tablet by mouth once daily 84 tablet 3    clotrimazole-betamethasone (LOTRISONE) 1-0.05 % cream Apply a thin film to the affected area 2 times daily. (Patient not taking: Reported on 2/24/2022) 45 g 0     No current facility-administered medications for this visit. No Known Allergies    All patients pastmedical, surgical, social and family history has been reviewed. Subjective:      Review of Systems   Constitutional: Positive for activity change, appetite change and fever. HENT: Positive for congestion, postnasal drip, rhinorrhea and sore throat. Loss of smell   Respiratory: Negative for cough, shortness of breath and wheezing. Cardiovascular: Negative for chest pain and palpitations. Gastrointestinal: Positive for nausea. Negative for diarrhea and vomiting. Neurological: Positive for headaches.  Negative for dizziness and Answer:   2/22/2022     Order Specific Question:   Hospitalized for COVID-19? Answer:   No     Order Specific Question:   Admitted to ICU for COVID-19? Answer:   No     Order Specific Question:   Employed in healthcare setting? Answer:   No     Order Specific Question:   Resident in a congregate (group) care setting? Answer:   No     Order Specific Question:   Pregnant? Answer:   Unknown     Order Specific Question:   Previously tested for COVID-19? Answer:   Unknown     No orders of the defined types were placed in this encounter. Patient given educational materials - see patient instructions. All patient questionsanswered. Pt voiced understanding. Reviewed health maintenance.      Electronically signed by CAR Neff CNP, CNP on 2/24/2022 at 12:13 PM

## 2022-02-25 LAB
SARS-COV-2: NORMAL
SARS-COV-2: NOT DETECTED
SOURCE: NORMAL

## 2022-06-06 RX ORDER — LEVONORGESTREL AND ETHINYL ESTRADIOL 0.1-0.02MG
KIT ORAL
Qty: 84 TABLET | Refills: 3 | OUTPATIENT
Start: 2022-06-06

## 2022-06-16 ENCOUNTER — OFFICE VISIT (OUTPATIENT)
Dept: OBGYN | Age: 27
End: 2022-06-16
Payer: COMMERCIAL

## 2022-06-16 ENCOUNTER — HOSPITAL ENCOUNTER (OUTPATIENT)
Age: 27
Setting detail: SPECIMEN
Discharge: HOME | End: 2022-06-16
Payer: COMMERCIAL

## 2022-06-16 VITALS
SYSTOLIC BLOOD PRESSURE: 120 MMHG | BODY MASS INDEX: 26.92 KG/M2 | HEART RATE: 95 BPM | WEIGHT: 142.6 LBS | HEIGHT: 61 IN | OXYGEN SATURATION: 98 % | DIASTOLIC BLOOD PRESSURE: 80 MMHG

## 2022-06-16 DIAGNOSIS — Z11.3 ROUTINE SCREENING FOR STI (SEXUALLY TRANSMITTED INFECTION): ICD-10-CM

## 2022-06-16 DIAGNOSIS — Z12.4 SCREENING FOR MALIGNANT NEOPLASM OF CERVIX: ICD-10-CM

## 2022-06-16 DIAGNOSIS — Z01.419 WELL FEMALE EXAM WITH ROUTINE GYNECOLOGICAL EXAM: Primary | ICD-10-CM

## 2022-06-16 DIAGNOSIS — Z30.41 ENCOUNTER FOR SURVEILLANCE OF CONTRACEPTIVE PILLS: ICD-10-CM

## 2022-06-16 PROCEDURE — 87510 GARDNER VAG DNA DIR PROBE: CPT

## 2022-06-16 PROCEDURE — 87660 TRICHOMONAS VAGIN DIR PROBE: CPT

## 2022-06-16 PROCEDURE — 99395 PREV VISIT EST AGE 18-39: CPT | Performed by: ADVANCED PRACTICE MIDWIFE

## 2022-06-16 PROCEDURE — 87591 N.GONORRHOEAE DNA AMP PROB: CPT

## 2022-06-16 PROCEDURE — 87480 CANDIDA DNA DIR PROBE: CPT

## 2022-06-16 PROCEDURE — G0145 SCR C/V CYTO,THINLAYER,RESCR: HCPCS

## 2022-06-16 PROCEDURE — 87491 CHLMYD TRACH DNA AMP PROBE: CPT

## 2022-06-16 RX ORDER — LEVONORGESTREL AND ETHINYL ESTRADIOL 0.1-0.02MG
KIT ORAL
Qty: 28 TABLET | Refills: 12 | Status: SHIPPED | OUTPATIENT
Start: 2022-06-16

## 2022-06-16 ASSESSMENT — ENCOUNTER SYMPTOMS
RESPIRATORY NEGATIVE: 1
GASTROINTESTINAL NEGATIVE: 1
EYES NEGATIVE: 1

## 2022-06-16 ASSESSMENT — PATIENT HEALTH QUESTIONNAIRE - PHQ9
1. LITTLE INTEREST OR PLEASURE IN DOING THINGS: 0
SUM OF ALL RESPONSES TO PHQ QUESTIONS 1-9: 0
2. FEELING DOWN, DEPRESSED OR HOPELESS: 0
SUM OF ALL RESPONSES TO PHQ9 QUESTIONS 1 & 2: 0
SUM OF ALL RESPONSES TO PHQ QUESTIONS 1-9: 0

## 2022-06-16 NOTE — PROGRESS NOTES
Subjective:      Patient ID: Whit Oneal  is a 27 y.o. y.o. female. Hue presents today for annual examination. She reports monthly menses well controlled on OCP's and she desires continued use of the pills. She reports bleeding for 4-5 days and requires a regular tampon change every 4 hours. She reports no menstrual pain or acne. She is sexually active and denies pain or bleeding with intercourse. Review of Systems   Constitutional: Negative. HENT: Negative. Eyes: Negative. Respiratory: Negative. Cardiovascular: Negative. Gastrointestinal: Negative. Genitourinary: Positive for menstrual problem. Musculoskeletal: Negative. Skin: Negative. Neurological: Negative. Psychiatric/Behavioral: Negative. Breast ROS: negative    Objective:   /80   Pulse 95   Ht 5' 1\" (1.549 m)   Wt 142 lb 9.6 oz (64.7 kg)   LMP 06/03/2022 (Exact Date)   SpO2 98%   Breastfeeding No   BMI 26.94 kg/m²   Physical Exam  Constitutional:       Appearance: She is well-developed and normal weight. HENT:      Head: Normocephalic and atraumatic. Eyes:      Conjunctiva/sclera: Conjunctivae normal.   Cardiovascular:      Rate and Rhythm: Normal rate and regular rhythm. Heart sounds: Normal heart sounds. Pulmonary:      Effort: Pulmonary effort is normal.      Breath sounds: Normal breath sounds. Chest:   Breasts: Breasts are symmetrical.      Right: No inverted nipple, mass, nipple discharge, skin change or tenderness. Left: No inverted nipple, mass, nipple discharge, skin change or tenderness. Abdominal:      Palpations: Abdomen is soft. Genitourinary:     General: Normal vulva. Vagina: Normal.      Comments: External genitalia WNL, no lesions noted. Vaginal canal is pink with rugae present and normal appearing, nonodorous discharge. Cervix is nulliparous, freely mobile and nontender. Uterus is NSSAVNT, adnexa are small, freely mobile and nontender. Musculoskeletal:         General: Normal range of motion. Cervical back: Normal range of motion and neck supple. Skin:     General: Skin is warm and dry. Neurological:      Mental Status: She is alert and oriented to person, place, and time. Deep Tendon Reflexes: Reflexes are normal and symmetric. Psychiatric:         Mood and Affect: Mood normal.         Thought Content: Thought content normal.         Sexually active: Yes  Any bleeding or pain withintercourse: No  Last Yearly:  6/2021  Last Pap: 6/2021 ASCUS, HR HPV Not detected  Pap 11/2020 ASCUS, cannot R/O HGSIL, HR HPV Not Detected  Pap 5/2020 LGSIL  Do you do self breast exams: Yes  Family H/O Breast Cancer Mat. Aunt in her 42's, Ovarian Cancer Maternal Aunt. Assessment:      Diagnosis Orders   1. Well female exam with routine gynecological exam     2. Screening for malignant neoplasm of cervix  PAP SMEAR   3. Routine screening for STI (sexually transmitted infection)  Chlamydia/GC DNA, Thin Prep    Vaginitis DNA Probe   4. Encounter for surveillance of contraceptive pills             Plan:      Orders Placed This Encounter   Procedures    Chlamydia/GC DNA, Thin Prep     Standing Status:   Future     Standing Expiration Date:   6/16/2023    Vaginitis DNA Probe     Standing Status:   Future     Standing Expiration Date:   12/16/2022    PAP SMEAR     Patient History:    No LMP recorded.   OBGYN Status: Having periods  Past Surgical History:  No date: WISDOM TOOTH EXTRACTION; Bilateral  Medications/Contraceptives Affecting Cytology     Combination Contraceptives - Oral Disp Start End     levonorgestrel-ethinyl estradiol (AVIANE;ALESSE;LESSINA) 0.1-20 MG-MCG   per tablet    84 tablet 6/24/2021     Sig: Take 1 tablet by mouth once daily       Social History    Tobacco Use      Smoking status: Never Smoker      Smokeless tobacco: Never Used       Standing Status:   Future     Standing Expiration Date:   7/1/2023     Order Specific Question: Collection Type     Answer: Thin Prep     Order Specific Question:   Prior Abnormal Pap Test     Answer:   Yes     Order Specific Question:   If Prior Abnormal, Give Date     Answer:   06/24/2021 ASCUS - HPV     Order Specific Question:   Prior Treatment     Answer: Other     Comments:   REPEAT PAP     Order Specific Question:   Screening or Diagnostic     Answer:   Screening     Order Specific Question:   HPV Requested? Answer:   Yes -  If ASCUS Reflex HPV     Order Specific Question:   High Risk Patient     Answer:   N/A   Education: diet with hydration, calcium intake 6282-7750 mg./day and weight bearing exercise 30-50 minutes 3-5x/week. SBE monthly and screening mammogram yearly starting age 36  Reviewed how to take the pills, missed and late pills and ACHES. RTO 12 months and prn.

## 2022-06-17 LAB
CANDIDA SPECIES, DNA PROBE: NEGATIVE
CHLAMYDIA BY THIN PREP: NEGATIVE
GARDNERELLA VAGINALIS, DNA PROBE: NEGATIVE
N. GONORRHOEAE DNA, THIN PREP: NEGATIVE
SOURCE: NORMAL
SPECIMEN DESCRIPTION: NORMAL
TRICHOMONAS VAGINALIS DNA: NEGATIVE

## 2022-06-17 RX ORDER — LEVONORGESTREL AND ETHINYL ESTRADIOL 0.1-0.02MG
KIT ORAL
Qty: 84 TABLET | OUTPATIENT
Start: 2022-06-17

## 2022-06-24 LAB — CYTOLOGY REPORT: NORMAL

## 2022-07-18 DIAGNOSIS — R87.612 LOW GRADE SQUAMOUS INTRAEPITHELIAL LESION (LGSIL) ON CERVICAL PAP SMEAR: Primary | ICD-10-CM

## 2022-07-18 RX ORDER — FOLIC ACID-PYRIDOXINE-CYANOCOBALAMIN TAB 2.5-25-2 MG 2.5-25-2 MG
1 TAB ORAL DAILY
Qty: 90 TABLET | Refills: 3 | Status: SHIPPED | OUTPATIENT
Start: 2022-07-18 | End: 2022-09-26

## 2022-08-24 ENCOUNTER — OFFICE VISIT (OUTPATIENT)
Dept: FAMILY MEDICINE CLINIC | Age: 27
End: 2022-08-24
Payer: COMMERCIAL

## 2022-08-24 VITALS
OXYGEN SATURATION: 97 % | HEIGHT: 61 IN | SYSTOLIC BLOOD PRESSURE: 108 MMHG | WEIGHT: 142 LBS | BODY MASS INDEX: 26.81 KG/M2 | HEART RATE: 86 BPM | RESPIRATION RATE: 16 BRPM | DIASTOLIC BLOOD PRESSURE: 68 MMHG

## 2022-08-24 DIAGNOSIS — F41.1 GENERALIZED ANXIETY DISORDER: Primary | ICD-10-CM

## 2022-08-24 PROCEDURE — 99214 OFFICE O/P EST MOD 30 MIN: CPT | Performed by: NURSE PRACTITIONER

## 2022-08-24 RX ORDER — BUSPIRONE HYDROCHLORIDE 5 MG/1
5 TABLET ORAL 3 TIMES DAILY PRN
Qty: 90 TABLET | Refills: 0 | Status: SHIPPED | OUTPATIENT
Start: 2022-08-24 | End: 2022-09-26 | Stop reason: ALTCHOICE

## 2022-08-24 SDOH — ECONOMIC STABILITY: FOOD INSECURITY: WITHIN THE PAST 12 MONTHS, YOU WORRIED THAT YOUR FOOD WOULD RUN OUT BEFORE YOU GOT MONEY TO BUY MORE.: PATIENT DECLINED

## 2022-08-24 SDOH — ECONOMIC STABILITY: FOOD INSECURITY: WITHIN THE PAST 12 MONTHS, THE FOOD YOU BOUGHT JUST DIDN'T LAST AND YOU DIDN'T HAVE MONEY TO GET MORE.: PATIENT DECLINED

## 2022-08-24 ASSESSMENT — SOCIAL DETERMINANTS OF HEALTH (SDOH): HOW HARD IS IT FOR YOU TO PAY FOR THE VERY BASICS LIKE FOOD, HOUSING, MEDICAL CARE, AND HEATING?: PATIENT DECLINED

## 2022-08-24 NOTE — PROGRESS NOTES
Subjective:      Patient ID: Jv Vila is a 24 y.o. female coming in for   Chief Complaint   Patient presents with    New Patient     Pt is a travel nurse. Concerned about possibly panic attacks or anxiety       HPI  Episode of a panic attack last week. Heart racing, impending doom feeling. Works as a traveling nurse reports she was not doing anything she has not done in the past. Episode lasted 4-5 hours. Since that episode does not feel anxious, but feels like her mind is not calm. Denies trouble sleeping. Review of Systems     Objective:/68   Pulse 86   Resp 16   Ht 5' 1\" (1.549 m)   Wt 142 lb (64.4 kg)   SpO2 97%   BMI 26.83 kg/m²      Physical Exam  Vitals and nursing note reviewed. Constitutional:       General: She is not in acute distress. Appearance: Normal appearance. She is not ill-appearing. HENT:      Head: Normocephalic. Cardiovascular:      Rate and Rhythm: Normal rate and regular rhythm. Heart sounds: Normal heart sounds. Pulmonary:      Effort: Pulmonary effort is normal.      Breath sounds: Normal breath sounds. Musculoskeletal:      Cervical back: Neck supple. Right lower leg: No edema. Left lower leg: No edema. Skin:     General: Skin is warm and dry. Findings: No rash. Neurological:      General: No focal deficit present. Mental Status: She is alert and oriented to person, place, and time. Psychiatric:         Mood and Affect: Mood is anxious. Speech: Speech normal.         Behavior: Behavior normal.         Thought Content: Thought content normal.        Assessment:      1.  Generalized anxiety disorder           Plan:    -checking labs to rule out underlying cause to symptoms.  -trial of buspar tid prn for anxiety  -f/u in one month    Orders Placed This Encounter   Procedures    TSH     Standing Status:   Future     Standing Expiration Date:   8/24/2023    T4, Free     Standing Status:   Future     Standing Expiration Date:   8/24/2023    CBC with Auto Differential     Standing Status:   Future     Standing Expiration Date:   8/24/2023    Comprehensive Metabolic Panel     Standing Status:   Future     Standing Expiration Date:   8/24/2023      Outpatient Encounter Medications as of 8/24/2022   Medication Sig Dispense Refill    busPIRone (BUSPAR) 5 MG tablet Take 1 tablet by mouth 3 times daily as needed (anxiety) 90 tablet 0    folic acid-pyridoxine-cyancobalamin (FOLBIC) 2.5-25-2 MG TABS Take 1 tablet by mouth in the morning. 90 tablet 3    levonorgestrel-ethinyl estradiol (AVIANE;ALESSE;LESSINA) 0.1-20 MG-MCG per tablet Take 1 tablet by mouth once daily 28 tablet 12    pyridoxine (B-6) 25 MG tablet Take 25 mg by mouth daily      [DISCONTINUED] clotrimazole-betamethasone (LOTRISONE) 1-0.05 % cream Apply a thin film to the affected area 2 times daily. (Patient not taking: Reported on 8/24/2022) 45 g 0     No facility-administered encounter medications on file as of 8/24/2022.             Dequan Flores, CAR - CNP

## 2022-09-23 ENCOUNTER — HOSPITAL ENCOUNTER (OUTPATIENT)
Dept: LAB | Age: 27
Discharge: HOME | End: 2022-09-23
Payer: COMMERCIAL

## 2022-09-23 DIAGNOSIS — F41.1 GENERALIZED ANXIETY DISORDER: ICD-10-CM

## 2022-09-23 LAB
ABSOLUTE EOS #: 0.03 K/UL (ref 0–0.44)
ABSOLUTE IMMATURE GRANULOCYTE: <0.03 K/UL (ref 0–0.3)
ABSOLUTE LYMPH #: 1.73 K/UL (ref 1.1–3.7)
ABSOLUTE MONO #: 0.33 K/UL (ref 0.1–1.2)
ALBUMIN SERPL-MCNC: 4.6 G/DL (ref 3.5–5.2)
ALBUMIN/GLOBULIN RATIO: 1.6 (ref 1–2.5)
ALP BLD-CCNC: 84 U/L (ref 35–104)
ALT SERPL-CCNC: 17 U/L (ref 5–33)
ANION GAP SERPL CALCULATED.3IONS-SCNC: 11 MMOL/L (ref 9–17)
AST SERPL-CCNC: 16 U/L
BASOPHILS # BLD: 0 % (ref 0–2)
BASOPHILS ABSOLUTE: 0.03 K/UL (ref 0–0.2)
BILIRUB SERPL-MCNC: 0.5 MG/DL (ref 0.3–1.2)
BUN BLDV-MCNC: 8 MG/DL (ref 6–20)
BUN/CREAT BLD: 13 (ref 9–20)
CALCIUM SERPL-MCNC: 9.9 MG/DL (ref 8.6–10.4)
CHLORIDE BLD-SCNC: 104 MMOL/L (ref 98–107)
CO2: 25 MMOL/L (ref 20–31)
CREAT SERPL-MCNC: 0.63 MG/DL (ref 0.5–0.9)
EOSINOPHILS RELATIVE PERCENT: 0 % (ref 1–4)
GFR AFRICAN AMERICAN: >60 ML/MIN
GFR NON-AFRICAN AMERICAN: >60 ML/MIN
GFR SERPL CREATININE-BSD FRML MDRD: NORMAL ML/MIN/{1.73_M2}
GLUCOSE BLD-MCNC: 97 MG/DL (ref 70–99)
HCT VFR BLD CALC: 41.2 % (ref 36.3–47.1)
HEMOGLOBIN: 14.7 G/DL (ref 11.9–15.1)
IMMATURE GRANULOCYTES: 0 %
LYMPHOCYTES # BLD: 25 % (ref 24–43)
MCH RBC QN AUTO: 31.3 PG (ref 25.2–33.5)
MCHC RBC AUTO-ENTMCNC: 35.7 G/DL (ref 25.2–33.5)
MCV RBC AUTO: 87.8 FL (ref 82.6–102.9)
MONOCYTES # BLD: 5 % (ref 3–12)
NRBC AUTOMATED: 0 PER 100 WBC
PDW BLD-RTO: 11.8 % (ref 11.8–14.4)
PLATELET # BLD: 238 K/UL (ref 138–453)
PMV BLD AUTO: 9.8 FL (ref 8.1–13.5)
POTASSIUM SERPL-SCNC: 4.1 MMOL/L (ref 3.7–5.3)
RBC # BLD: 4.69 M/UL (ref 3.95–5.11)
SEG NEUTROPHILS: 70 % (ref 36–65)
SEGMENTED NEUTROPHILS ABSOLUTE COUNT: 4.88 K/UL (ref 1.5–8.1)
SODIUM BLD-SCNC: 140 MMOL/L (ref 135–144)
THYROXINE, FREE: 1.56 NG/DL (ref 0.93–1.7)
TOTAL PROTEIN: 7.4 G/DL (ref 6.4–8.3)
TSH SERPL DL<=0.05 MIU/L-ACNC: 1.98 UIU/ML (ref 0.3–5)
WBC # BLD: 7 K/UL (ref 3.5–11.3)

## 2022-09-23 PROCEDURE — 36415 COLL VENOUS BLD VENIPUNCTURE: CPT

## 2022-09-23 PROCEDURE — 84443 ASSAY THYROID STIM HORMONE: CPT

## 2022-09-23 PROCEDURE — 80053 COMPREHEN METABOLIC PANEL: CPT

## 2022-09-23 PROCEDURE — 84439 ASSAY OF FREE THYROXINE: CPT

## 2022-09-23 PROCEDURE — 85025 COMPLETE CBC W/AUTO DIFF WBC: CPT

## 2022-09-26 ENCOUNTER — OFFICE VISIT (OUTPATIENT)
Dept: FAMILY MEDICINE CLINIC | Age: 27
End: 2022-09-26
Payer: COMMERCIAL

## 2022-09-26 VITALS
RESPIRATION RATE: 16 BRPM | WEIGHT: 142.6 LBS | HEIGHT: 61 IN | HEART RATE: 96 BPM | OXYGEN SATURATION: 99 % | DIASTOLIC BLOOD PRESSURE: 64 MMHG | SYSTOLIC BLOOD PRESSURE: 122 MMHG | BODY MASS INDEX: 26.92 KG/M2

## 2022-09-26 DIAGNOSIS — F41.1 GENERALIZED ANXIETY DISORDER: ICD-10-CM

## 2022-09-26 PROCEDURE — 99214 OFFICE O/P EST MOD 30 MIN: CPT | Performed by: NURSE PRACTITIONER

## 2022-09-26 RX ORDER — FLUOXETINE 10 MG/1
10 CAPSULE ORAL DAILY
Qty: 30 CAPSULE | Refills: 1 | Status: SHIPPED | OUTPATIENT
Start: 2022-09-26

## 2022-09-26 RX ORDER — ALPRAZOLAM 0.25 MG/1
0.25 TABLET ORAL 3 TIMES DAILY PRN
Qty: 30 TABLET | Refills: 0 | Status: SHIPPED | OUTPATIENT
Start: 2022-09-26 | End: 2022-10-06

## 2022-09-26 NOTE — LETTER
[facility] department of [place] [address]  Phone: [phone]  Fax: [phone]    CAR Johnson CNP        September 26, 2022     Patient: Zuhair Tipton   YOB: 1995   Date of Visit: 9/26/2022       To Whom It May Concern: It is my medical opinion that Zuhair Tipton may be excused from work 9/23/22. If you have any questions or concerns, please don't hesitate to call.     Sincerely,        CAR Johnson CNP
I last felt him move at 1700

## 2022-09-26 NOTE — PROGRESS NOTES
Subjective:      Patient ID: Sarath Hooks is a 24 y.o. female coming in for   Chief Complaint   Patient presents with    Medication Check     1M fu      HPI  F/u anxiety. Trialed buspar 5mg tid prn. Reports it actually made her feel worse. Gave her nightmares. Still gettting approx 3-4 episodes per week. Some lasting minutes to hours and others last all day long. Reports last week she actually had to leave work due to her anxiety being so bad. Review of Systems   Psychiatric/Behavioral:  Positive for sleep disturbance. The patient is nervous/anxious. All other systems reviewed and are negative. Objective:/64   Pulse 96   Resp 16   Ht 5' 1\" (1.549 m)   Wt 142 lb 9.6 oz (64.7 kg)   SpO2 99%   BMI 26.94 kg/m²      Physical Exam  Vitals and nursing note reviewed. Constitutional:       General: She is not in acute distress. Appearance: Normal appearance. She is not ill-appearing. HENT:      Head: Normocephalic. Cardiovascular:      Rate and Rhythm: Normal rate and regular rhythm. Heart sounds: Normal heart sounds. Pulmonary:      Effort: Pulmonary effort is normal.      Breath sounds: Normal breath sounds. Musculoskeletal:      Cervical back: Neck supple. Right lower leg: No edema. Left lower leg: No edema. Skin:     General: Skin is warm and dry. Findings: No rash. Neurological:      General: No focal deficit present. Mental Status: She is alert and oriented to person, place, and time. Psychiatric:         Mood and Affect: Mood is anxious. Speech: Speech normal.         Behavior: Behavior normal.         Thought Content: Thought content normal.        Assessment:      1. Generalized anxiety disorder           Plan:    -stop buspar  -start low dose prozac 10mg daily  -0.25mg xanax for prn use, recommend trialing while at home.   -f/u in 6-8 weeks. No orders of the defined types were placed in this encounter.      Outpatient Encounter Medications as of 9/26/2022   Medication Sig Dispense Refill    FLUoxetine (PROZAC) 10 MG capsule Take 1 capsule by mouth daily 30 capsule 1    ALPRAZolam (XANAX) 0.25 MG tablet Take 1 tablet by mouth 3 times daily as needed for Anxiety for up to 10 days. 30 tablet 0    levonorgestrel-ethinyl estradiol (AVIANE;ALESSE;LESSINA) 0.1-20 MG-MCG per tablet Take 1 tablet by mouth once daily 28 tablet 12    [DISCONTINUED] busPIRone (BUSPAR) 5 MG tablet Take 1 tablet by mouth 3 times daily as needed (anxiety) 90 tablet 0    [DISCONTINUED] folic acid-pyridoxine-cyancobalamin (FOLBIC) 2.5-25-2 MG TABS Take 1 tablet by mouth in the morning. (Patient not taking: Reported on 9/26/2022) 90 tablet 3    [DISCONTINUED] pyridoxine (B-6) 25 MG tablet Take 25 mg by mouth daily (Patient not taking: Reported on 9/26/2022)       No facility-administered encounter medications on file as of 9/26/2022.             Mary Crabtree, APRN - CNP

## 2022-10-26 DIAGNOSIS — F41.1 GENERALIZED ANXIETY DISORDER: ICD-10-CM

## 2022-10-26 RX ORDER — FLUOXETINE 10 MG/1
10 CAPSULE ORAL DAILY
Qty: 30 CAPSULE | Refills: 1 | OUTPATIENT
Start: 2022-10-26

## 2022-11-09 ENCOUNTER — OFFICE VISIT (OUTPATIENT)
Dept: FAMILY MEDICINE CLINIC | Age: 27
End: 2022-11-09
Payer: COMMERCIAL

## 2022-11-09 VITALS
BODY MASS INDEX: 26.62 KG/M2 | SYSTOLIC BLOOD PRESSURE: 118 MMHG | HEIGHT: 61 IN | DIASTOLIC BLOOD PRESSURE: 70 MMHG | WEIGHT: 141 LBS | OXYGEN SATURATION: 99 % | HEART RATE: 80 BPM

## 2022-11-09 DIAGNOSIS — F41.1 GENERALIZED ANXIETY DISORDER: ICD-10-CM

## 2022-11-09 PROCEDURE — 99213 OFFICE O/P EST LOW 20 MIN: CPT | Performed by: NURSE PRACTITIONER

## 2022-11-09 RX ORDER — FLUOXETINE 10 MG/1
10 CAPSULE ORAL DAILY
Qty: 90 CAPSULE | Refills: 1 | Status: SHIPPED | OUTPATIENT
Start: 2022-11-09

## 2022-11-09 ASSESSMENT — ANXIETY QUESTIONNAIRES
7. FEELING AFRAID AS IF SOMETHING AWFUL MIGHT HAPPEN: 0
5. BEING SO RESTLESS THAT IT IS HARD TO SIT STILL: 0
1. FEELING NERVOUS, ANXIOUS, OR ON EDGE: 0
2. NOT BEING ABLE TO STOP OR CONTROL WORRYING: 0
4. TROUBLE RELAXING: 0
3. WORRYING TOO MUCH ABOUT DIFFERENT THINGS: 0
6. BECOMING EASILY ANNOYED OR IRRITABLE: 0
GAD7 TOTAL SCORE: 0
IF YOU CHECKED OFF ANY PROBLEMS ON THIS QUESTIONNAIRE, HOW DIFFICULT HAVE THESE PROBLEMS MADE IT FOR YOU TO DO YOUR WORK, TAKE CARE OF THINGS AT HOME, OR GET ALONG WITH OTHER PEOPLE: NOT DIFFICULT AT ALL

## 2022-11-09 ASSESSMENT — PATIENT HEALTH QUESTIONNAIRE - PHQ9
SUM OF ALL RESPONSES TO PHQ QUESTIONS 1-9: 0
SUM OF ALL RESPONSES TO PHQ QUESTIONS 1-9: 0
1. LITTLE INTEREST OR PLEASURE IN DOING THINGS: 0
2. FEELING DOWN, DEPRESSED OR HOPELESS: 0
SUM OF ALL RESPONSES TO PHQ QUESTIONS 1-9: 0
SUM OF ALL RESPONSES TO PHQ QUESTIONS 1-9: 0
SUM OF ALL RESPONSES TO PHQ9 QUESTIONS 1 & 2: 0

## 2022-11-09 NOTE — PROGRESS NOTES
Subjective:      Patient ID: Maddie Pederson is a 24 y.o. female coming in for   Chief Complaint   Patient presents with    Anxiety     Prozac is working. HPI  6 wks anxiety f/u. Pt started on prozac 10mg daily on 9/26/22. Pt reports her anxiety symptoms are 100% resolved. Denies any side effect issues. Review of Systems   Psychiatric/Behavioral:  The patient is not nervous/anxious. All other systems reviewed and are negative. Objective:/70   Pulse 80   Ht 5' 1\" (1.549 m)   Wt 141 lb (64 kg)   LMP 10/10/2022 (Approximate)   SpO2 99%   BMI 26.64 kg/m²      Physical Exam  Vitals and nursing note reviewed. Constitutional:       Appearance: Normal appearance. HENT:      Head: Normocephalic. Pulmonary:      Effort: Pulmonary effort is normal.   Skin:     General: Skin is warm and dry. Neurological:      General: No focal deficit present. Mental Status: She is alert and oriented to person, place, and time. Psychiatric:         Mood and Affect: Mood normal.         Behavior: Behavior normal.         Thought Content: Thought content normal.         Judgment: Judgment normal.        Assessment:      1. Generalized anxiety disorder           Plan:    -doing well with prozac 10mg daily. Continue current regimen. -f/u in 6 months    No orders of the defined types were placed in this encounter. Outpatient Encounter Medications as of 11/9/2022   Medication Sig Dispense Refill    FLUoxetine (PROZAC) 10 MG capsule Take 1 capsule by mouth daily 90 capsule 1    levonorgestrel-ethinyl estradiol (AVIANE;ALESSE;LESSINA) 0.1-20 MG-MCG per tablet Take 1 tablet by mouth once daily 28 tablet 12    [DISCONTINUED] FLUoxetine (PROZAC) 10 MG capsule Take 1 capsule by mouth daily 30 capsule 1     No facility-administered encounter medications on file as of 11/9/2022.             CAR Toscano - CNP

## 2023-05-19 ENCOUNTER — OFFICE VISIT (OUTPATIENT)
Dept: FAMILY MEDICINE CLINIC | Age: 28
End: 2023-05-19
Payer: COMMERCIAL

## 2023-05-19 VITALS
DIASTOLIC BLOOD PRESSURE: 66 MMHG | OXYGEN SATURATION: 96 % | RESPIRATION RATE: 16 BRPM | SYSTOLIC BLOOD PRESSURE: 116 MMHG | HEART RATE: 83 BPM | WEIGHT: 133 LBS | HEIGHT: 61 IN | BODY MASS INDEX: 25.11 KG/M2

## 2023-05-19 DIAGNOSIS — F41.1 GENERALIZED ANXIETY DISORDER: ICD-10-CM

## 2023-05-19 PROCEDURE — 99213 OFFICE O/P EST LOW 20 MIN: CPT | Performed by: NURSE PRACTITIONER

## 2023-05-19 RX ORDER — FLUOXETINE 10 MG/1
10 CAPSULE ORAL DAILY
Qty: 90 CAPSULE | Refills: 3 | Status: SHIPPED | OUTPATIENT
Start: 2023-05-19

## 2023-05-19 ASSESSMENT — PATIENT HEALTH QUESTIONNAIRE - PHQ9
2. FEELING DOWN, DEPRESSED OR HOPELESS: 0
SUM OF ALL RESPONSES TO PHQ9 QUESTIONS 1 & 2: 0
SUM OF ALL RESPONSES TO PHQ QUESTIONS 1-9: 0
1. LITTLE INTEREST OR PLEASURE IN DOING THINGS: 0
SUM OF ALL RESPONSES TO PHQ QUESTIONS 1-9: 0

## 2023-05-19 NOTE — PROGRESS NOTES
Subjective:      Patient ID: John Jarrett is a 24 y.o. female coming in for   Chief Complaint   Patient presents with    Anxiety     6 month follow up. Prozac 10mg        HPI  6 month anxiety f/u. Pt started on prozac 10mg daily on 9/26/22. Pt reports her anxiety symptoms are 100% resolved. Denies any side effect issues. needs a refill today. Review of Systems   Constitutional:  Negative for fatigue and unexpected weight change. Respiratory:  Negative for shortness of breath. Cardiovascular:  Negative for chest pain and leg swelling. Gastrointestinal:  Negative for abdominal pain, blood in stool, constipation and diarrhea. Skin:  Negative for rash. Neurological:  Negative for dizziness, light-headedness and headaches. Psychiatric/Behavioral:  The patient is not nervous/anxious. All other systems reviewed and are negative. Objective:/66   Pulse 83   Resp 16   Ht 5' 1\" (1.549 m)   Wt 133 lb (60.3 kg)   LMP 05/01/2023 (Approximate)   SpO2 96%   BMI 25.13 kg/m²      Physical Exam  Vitals and nursing note reviewed. Constitutional:       Appearance: Normal appearance. HENT:      Head: Normocephalic. Pulmonary:      Effort: Pulmonary effort is normal.   Skin:     General: Skin is warm and dry. Neurological:      General: No focal deficit present. Mental Status: She is alert and oriented to person, place, and time. Psychiatric:         Mood and Affect: Mood normal.         Behavior: Behavior normal.         Thought Content: Thought content normal.         Judgment: Judgment normal.        Assessment:      1. Generalized anxiety disorder           Plan:    -pt doing very well with prozac 10mg. Refills sent in. I will see her back in one year or as needed. No orders of the defined types were placed in this encounter.      Outpatient Encounter Medications as of 5/19/2023   Medication Sig Dispense Refill    FLUoxetine (PROZAC) 10 MG capsule Take 1 capsule by mouth

## 2023-05-20 ASSESSMENT — ENCOUNTER SYMPTOMS
ABDOMINAL PAIN: 0
CONSTIPATION: 0
DIARRHEA: 0
SHORTNESS OF BREATH: 0
BLOOD IN STOOL: 0

## 2023-05-30 RX ORDER — LEVONORGESTREL AND ETHINYL ESTRADIOL 0.1-0.02MG
KIT ORAL
Qty: 28 TABLET | Refills: 1 | Status: SHIPPED | OUTPATIENT
Start: 2023-05-30 | End: 2023-07-26

## 2023-07-24 NOTE — TELEPHONE ENCOUNTER
LOV: 6/16/22  NOV: none scheduled      Has had two refills since last appt and one cancelled appt.     Left message for patient to call office

## 2023-07-26 RX ORDER — LEVONORGESTREL AND ETHINYL ESTRADIOL 0.1-0.02MG
KIT ORAL
Qty: 28 TABLET | Refills: 1 | Status: SHIPPED | OUTPATIENT
Start: 2023-07-26

## 2023-08-22 ENCOUNTER — OFFICE VISIT (OUTPATIENT)
Dept: OBGYN | Age: 28
End: 2023-08-22

## 2023-08-22 VITALS
OXYGEN SATURATION: 97 % | DIASTOLIC BLOOD PRESSURE: 78 MMHG | BODY MASS INDEX: 23.07 KG/M2 | SYSTOLIC BLOOD PRESSURE: 104 MMHG | WEIGHT: 122.2 LBS | HEART RATE: 74 BPM | HEIGHT: 61 IN

## 2023-08-22 DIAGNOSIS — Z01.419 WELL FEMALE EXAM WITH ROUTINE GYNECOLOGICAL EXAM: Primary | ICD-10-CM

## 2023-08-22 PROBLEM — Z12.4 SCREENING FOR MALIGNANT NEOPLASM OF CERVIX: Status: ACTIVE | Noted: 2023-08-22

## 2023-08-22 PROCEDURE — 99395 PREV VISIT EST AGE 18-39: CPT | Performed by: ADVANCED PRACTICE MIDWIFE

## 2023-08-22 RX ORDER — LEVONORGESTREL AND ETHINYL ESTRADIOL 0.1-0.02MG
1 KIT ORAL DAILY
Qty: 28 TABLET | Refills: 12 | Status: SHIPPED | OUTPATIENT
Start: 2023-08-22

## 2023-08-22 SDOH — ECONOMIC STABILITY: INCOME INSECURITY: HOW HARD IS IT FOR YOU TO PAY FOR THE VERY BASICS LIKE FOOD, HOUSING, MEDICAL CARE, AND HEATING?: NOT HARD AT ALL

## 2023-08-22 SDOH — ECONOMIC STABILITY: FOOD INSECURITY: WITHIN THE PAST 12 MONTHS, YOU WORRIED THAT YOUR FOOD WOULD RUN OUT BEFORE YOU GOT MONEY TO BUY MORE.: NEVER TRUE

## 2023-08-22 SDOH — ECONOMIC STABILITY: FOOD INSECURITY: WITHIN THE PAST 12 MONTHS, THE FOOD YOU BOUGHT JUST DIDN'T LAST AND YOU DIDN'T HAVE MONEY TO GET MORE.: NEVER TRUE

## 2023-08-22 SDOH — ECONOMIC STABILITY: HOUSING INSECURITY
IN THE LAST 12 MONTHS, WAS THERE A TIME WHEN YOU DID NOT HAVE A STEADY PLACE TO SLEEP OR SLEPT IN A SHELTER (INCLUDING NOW)?: NO

## 2023-08-22 ASSESSMENT — PATIENT HEALTH QUESTIONNAIRE - PHQ9
SUM OF ALL RESPONSES TO PHQ QUESTIONS 1-9: 0
1. LITTLE INTEREST OR PLEASURE IN DOING THINGS: 0
2. FEELING DOWN, DEPRESSED OR HOPELESS: 0
SUM OF ALL RESPONSES TO PHQ9 QUESTIONS 1 & 2: 0

## 2023-08-22 ASSESSMENT — ENCOUNTER SYMPTOMS
EYES NEGATIVE: 1
GASTROINTESTINAL NEGATIVE: 1
RESPIRATORY NEGATIVE: 1

## 2023-09-21 PROBLEM — Z12.4 SCREENING FOR MALIGNANT NEOPLASM OF CERVIX: Status: RESOLVED | Noted: 2023-08-22 | Resolved: 2023-09-21

## 2024-05-24 ENCOUNTER — OFFICE VISIT (OUTPATIENT)
Dept: PRIMARY CARE CLINIC | Age: 29
End: 2024-05-24

## 2024-05-24 VITALS
HEART RATE: 81 BPM | DIASTOLIC BLOOD PRESSURE: 80 MMHG | OXYGEN SATURATION: 100 % | TEMPERATURE: 97.7 F | SYSTOLIC BLOOD PRESSURE: 118 MMHG | BODY MASS INDEX: 21.09 KG/M2 | WEIGHT: 111.6 LBS

## 2024-05-24 DIAGNOSIS — J06.9 UPPER RESPIRATORY TRACT INFECTION, UNSPECIFIED TYPE: Primary | ICD-10-CM

## 2024-05-24 PROCEDURE — 99212 OFFICE O/P EST SF 10 MIN: CPT | Performed by: NURSE PRACTITIONER

## 2024-05-24 PROCEDURE — 99213 OFFICE O/P EST LOW 20 MIN: CPT | Performed by: NURSE PRACTITIONER

## 2024-05-24 ASSESSMENT — PATIENT HEALTH QUESTIONNAIRE - PHQ9
SUM OF ALL RESPONSES TO PHQ QUESTIONS 1-9: 0
SUM OF ALL RESPONSES TO PHQ QUESTIONS 1-9: 0
1. LITTLE INTEREST OR PLEASURE IN DOING THINGS: NOT AT ALL
SUM OF ALL RESPONSES TO PHQ9 QUESTIONS 1 & 2: 0
SUM OF ALL RESPONSES TO PHQ QUESTIONS 1-9: 0
SUM OF ALL RESPONSES TO PHQ QUESTIONS 1-9: 0
2. FEELING DOWN, DEPRESSED OR HOPELESS: NOT AT ALL

## 2024-05-24 ASSESSMENT — ENCOUNTER SYMPTOMS
NAUSEA: 0
SORE THROAT: 1
VOMITING: 0
WHEEZING: 0
RHINORRHEA: 1
SHORTNESS OF BREATH: 0
DIARRHEA: 0
COUGH: 1

## 2024-05-24 NOTE — PROGRESS NOTES
[facility]  [address] [place], [id number]  [phone]      HPI:     Pharyngitis  This is a new problem. The current episode started yesterday. The problem occurs daily. The problem has been unchanged. Associated symptoms include congestion, coughing and a sore throat. Pertinent negatives include no chest pain, fever, headaches, nausea or vomiting. The symptoms are aggravated by swallowing, drinking and eating. She has tried nothing for the symptoms. The treatment provided no relief.       Current Outpatient Medications   Medication Sig Dispense Refill    levonorgestrel-ethinyl estradiol (AVIANE;ALESSE;LESSINA) 0.1-20 MG-MCG per tablet Take 1 tablet by mouth daily 28 tablet 12    FLUoxetine (PROZAC) 10 MG capsule Take 1 capsule by mouth daily (Patient not taking: Reported on 8/22/2023) 90 capsule 3     No current facility-administered medications for this visit.     No Known Allergies    All patients pastmedical, surgical, social and family history has been reviewed.    Subjective:      Review of Systems   Constitutional:  Negative for activity change, appetite change and fever.   HENT:  Positive for congestion, postnasal drip, rhinorrhea and sore throat.    Respiratory:  Positive for cough. Negative for shortness of breath and wheezing.    Cardiovascular:  Negative for chest pain and palpitations.   Gastrointestinal:  Negative for diarrhea, nausea and vomiting.   Neurological:  Negative for headaches.         Objective:      Physical Exam  Vitals and nursing note reviewed.   Constitutional:       Appearance: Normal appearance.   HENT:      Head: Normocephalic and atraumatic.      Right Ear: Tympanic membrane, ear canal and external ear normal.      Left Ear: Tympanic membrane, ear canal and external ear normal.      Nose: Congestion present.      Right Sinus: No maxillary sinus tenderness or frontal sinus tenderness.      Left Sinus: No maxillary sinus tenderness or frontal

## 2024-06-02 DIAGNOSIS — F41.1 GENERALIZED ANXIETY DISORDER: ICD-10-CM

## 2024-06-03 RX ORDER — FLUOXETINE 10 MG/1
10 CAPSULE ORAL DAILY
Qty: 30 CAPSULE | Refills: 0 | Status: SHIPPED | OUTPATIENT
Start: 2024-06-03

## 2024-06-03 NOTE — TELEPHONE ENCOUNTER
Hue called requesting a refill of the below medication which has been pended for you:     Requested Prescriptions     Pending Prescriptions Disp Refills    FLUoxetine (PROZAC) 10 MG capsule [Pharmacy Med Name: FLUOXETINE HCL 10 MG CAPSULE] 30 capsule 0     Sig: take 1 capsule by mouth once daily       Last Appointment Date: 5/19/2023  Next Appointment Date: Visit date not found    No Known Allergies

## 2024-06-29 DIAGNOSIS — F41.1 GENERALIZED ANXIETY DISORDER: ICD-10-CM

## 2024-07-01 RX ORDER — FLUOXETINE 10 MG/1
10 CAPSULE ORAL DAILY
Qty: 30 CAPSULE | Refills: 0 | OUTPATIENT
Start: 2024-07-01

## 2024-07-10 DIAGNOSIS — F41.1 GENERALIZED ANXIETY DISORDER: ICD-10-CM

## 2024-07-11 RX ORDER — FLUOXETINE 10 MG/1
10 CAPSULE ORAL DAILY
Qty: 30 CAPSULE | Refills: 0 | OUTPATIENT
Start: 2024-07-11

## 2024-08-26 ENCOUNTER — OFFICE VISIT (OUTPATIENT)
Dept: OBGYN | Age: 29
End: 2024-08-26

## 2024-08-26 ENCOUNTER — HOSPITAL ENCOUNTER (OUTPATIENT)
Age: 29
Setting detail: SPECIMEN
Discharge: HOME | End: 2024-08-26

## 2024-08-26 VITALS
BODY MASS INDEX: 21.38 KG/M2 | WEIGHT: 116.2 LBS | SYSTOLIC BLOOD PRESSURE: 100 MMHG | OXYGEN SATURATION: 100 % | HEART RATE: 70 BPM | DIASTOLIC BLOOD PRESSURE: 64 MMHG | HEIGHT: 62 IN

## 2024-08-26 DIAGNOSIS — Z12.4 SCREENING FOR MALIGNANT NEOPLASM OF CERVIX: ICD-10-CM

## 2024-08-26 DIAGNOSIS — F41.1 GENERALIZED ANXIETY DISORDER: ICD-10-CM

## 2024-08-26 DIAGNOSIS — N94.3 PMS (PREMENSTRUAL SYNDROME): ICD-10-CM

## 2024-08-26 DIAGNOSIS — Z01.419 WELL FEMALE EXAM WITH ROUTINE GYNECOLOGICAL EXAM: Primary | ICD-10-CM

## 2024-08-26 DIAGNOSIS — N94.6 DYSMENORRHEA: ICD-10-CM

## 2024-08-26 PROCEDURE — 99395 PREV VISIT EST AGE 18-39: CPT | Performed by: ADVANCED PRACTICE MIDWIFE

## 2024-08-26 PROCEDURE — G0145 SCR C/V CYTO,THINLAYER,RESCR: HCPCS

## 2024-08-26 PROCEDURE — 99395 PREV VISIT EST AGE 18-39: CPT

## 2024-08-26 PROCEDURE — 87624 HPV HI-RISK TYP POOLED RSLT: CPT

## 2024-08-26 RX ORDER — FLUOXETINE 10 MG/1
10 CAPSULE ORAL DAILY
Qty: 90 CAPSULE | Refills: 3 | Status: SHIPPED | OUTPATIENT
Start: 2024-08-26

## 2024-08-26 RX ORDER — LEVONORGESTREL/ETHIN.ESTRADIOL 0.1-0.02MG
1 TABLET ORAL DAILY
Qty: 84 TABLET | Refills: 5 | Status: SHIPPED | OUTPATIENT
Start: 2024-08-26

## 2024-08-26 ASSESSMENT — PATIENT HEALTH QUESTIONNAIRE - PHQ9
SUM OF ALL RESPONSES TO PHQ QUESTIONS 1-9: 0
SUM OF ALL RESPONSES TO PHQ QUESTIONS 1-9: 0
2. FEELING DOWN, DEPRESSED OR HOPELESS: NOT AT ALL
SUM OF ALL RESPONSES TO PHQ9 QUESTIONS 1 & 2: 0
SUM OF ALL RESPONSES TO PHQ QUESTIONS 1-9: 0
SUM OF ALL RESPONSES TO PHQ QUESTIONS 1-9: 0
1. LITTLE INTEREST OR PLEASURE IN DOING THINGS: NOT AT ALL

## 2024-08-26 ASSESSMENT — ENCOUNTER SYMPTOMS
GASTROINTESTINAL NEGATIVE: 1
EYES NEGATIVE: 1
RESPIRATORY NEGATIVE: 1

## 2024-08-26 NOTE — PROGRESS NOTES
Subjective:      Patient ID: Hue Lucia  is a 29 y.o. y.o. female.    Hue presents today for annual examination. She reports monthly menses well controlled on OCP's and she desires continued use of the pills. She reports bleeding for 4-5 days and requires a regular tampon change every 4 hours. She reports menstrual pain she would rate 6/10 on the 3rd and 4th day and occasionally uses midol for the pain, rare acne. She is sexually active and denies pain or bleeding with intercourse  She also desires to continue with prozac for anxiety and PMS.        Review of Systems   Constitutional: Negative.    HENT: Negative.     Eyes: Negative.    Respiratory: Negative.     Cardiovascular: Negative.    Gastrointestinal: Negative.    Genitourinary: Negative.    Musculoskeletal: Negative.    Skin: Negative.    Neurological: Negative.    Psychiatric/Behavioral:  The patient is nervous/anxious.    Breast ROS: negative    Objective:   /64 (Site: Right Upper Arm, Position: Sitting, Cuff Size: Medium Adult)   Pulse 70   Ht 1.564 m (5' 1.58\")   Wt 52.7 kg (116 lb 3.2 oz)   LMP 08/05/2024 (Approximate)   SpO2 100%   Breastfeeding No   BMI 21.55 kg/m²   Physical Exam  Constitutional:       Appearance: She is well-developed and normal weight.   HENT:      Head: Normocephalic and atraumatic.   Eyes:      Conjunctiva/sclera: Conjunctivae normal.   Cardiovascular:      Rate and Rhythm: Normal rate and regular rhythm.      Heart sounds: Normal heart sounds.   Pulmonary:      Effort: Pulmonary effort is normal.      Breath sounds: Normal breath sounds.   Chest:   Breasts:     Breasts are symmetrical.      Right: No inverted nipple, mass, nipple discharge, skin change or tenderness.      Left: No inverted nipple, mass, nipple discharge, skin change or tenderness.   Abdominal:      Palpations: Abdomen is soft.   Genitourinary:     General: Normal vulva.      Vagina: Normal.      Rectum: Normal.      Comments:  External

## 2024-08-28 LAB
HPV I/H RISK 4 DNA CVX QL NAA+PROBE: NOT DETECTED
HPV SAMPLE: NORMAL
HPV, INTERPRETATION: NORMAL
HPV16 DNA CVX QL NAA+PROBE: NOT DETECTED
HPV18 DNA CVX QL NAA+PROBE: NOT DETECTED
SPECIMEN DESCRIPTION: NORMAL

## 2024-09-03 LAB — CYTOLOGY REPORT: NORMAL

## 2024-10-18 NOTE — TELEPHONE ENCOUNTER
Diet controlled. declined meds.   Can take Omega 3 and Flaxeed. Information about food that can naturally lower cholesterol provided.    Writer called to reschedule pt from no show this am, pt declined rescheduling and hung up.

## 2025-08-28 ENCOUNTER — TELEPHONE (OUTPATIENT)
Dept: OBGYN | Age: 30
End: 2025-08-28

## 2025-08-29 DIAGNOSIS — N94.6 DYSMENORRHEA: Primary | ICD-10-CM

## 2025-08-29 RX ORDER — LEVONORGESTREL/ETHIN.ESTRADIOL 0.1-0.02MG
1 TABLET ORAL DAILY
Qty: 84 TABLET | Refills: 0 | Status: SHIPPED | OUTPATIENT
Start: 2025-08-29

## 2025-09-01 DIAGNOSIS — N94.6 DYSMENORRHEA: ICD-10-CM

## 2025-09-01 RX ORDER — LEVONORGESTREL/ETHIN.ESTRADIOL 0.1-0.02MG
1 TABLET ORAL DAILY
Qty: 84 TABLET | Refills: 0 | Status: SHIPPED | OUTPATIENT
Start: 2025-09-01